# Patient Record
Sex: MALE | Race: WHITE | Employment: UNEMPLOYED | URBAN - METROPOLITAN AREA
[De-identification: names, ages, dates, MRNs, and addresses within clinical notes are randomized per-mention and may not be internally consistent; named-entity substitution may affect disease eponyms.]

---

## 2017-02-10 ENCOUNTER — HOSPITAL ENCOUNTER (INPATIENT)
Facility: HOSPITAL | Age: 42
LOS: 6 days | Discharge: HOME | DRG: 056 | End: 2017-02-16
Attending: EMERGENCY MEDICINE | Admitting: HOSPITALIST
Payer: MEDICAID

## 2017-02-10 ENCOUNTER — APPOINTMENT (OUTPATIENT)
Dept: GENERAL RADIOLOGY | Facility: HOSPITAL | Age: 42
DRG: 056 | End: 2017-02-10
Attending: EMERGENCY MEDICINE
Payer: MEDICAID

## 2017-02-10 DIAGNOSIS — G70.00 MYASTHENIA GRAVIS (HCC): Primary | ICD-10-CM

## 2017-02-10 PROCEDURE — 71010 XR CHEST AP PORTABLE  (CPT=71010): CPT

## 2017-02-10 PROCEDURE — 99255 IP/OBS CONSLTJ NEW/EST HI 80: CPT | Performed by: OTHER

## 2017-02-10 RX ORDER — PYRIDOSTIGMINE BROMIDE 60 MG/1
120 TABLET ORAL EVERY 6 HOURS SCHEDULED
Status: DISCONTINUED | OUTPATIENT
Start: 2017-02-10 | End: 2017-02-16

## 2017-02-10 RX ORDER — DOCUSATE SODIUM 100 MG/1
100 CAPSULE, LIQUID FILLED ORAL 2 TIMES DAILY PRN
Status: DISCONTINUED | OUTPATIENT
Start: 2017-02-10 | End: 2017-02-16

## 2017-02-10 RX ORDER — PANTOPRAZOLE SODIUM 40 MG/1
40 TABLET, DELAYED RELEASE ORAL
Status: DISCONTINUED | OUTPATIENT
Start: 2017-02-11 | End: 2017-02-16

## 2017-02-10 RX ORDER — POTASSIUM CHLORIDE 20 MEQ/1
40 TABLET, EXTENDED RELEASE ORAL EVERY 4 HOURS
Status: COMPLETED | OUTPATIENT
Start: 2017-02-10 | End: 2017-02-11

## 2017-02-10 RX ORDER — FAMOTIDINE 40 MG/1
40 TABLET, FILM COATED ORAL 2 TIMES DAILY
Status: DISCONTINUED | OUTPATIENT
Start: 2017-02-10 | End: 2017-02-10

## 2017-02-10 RX ORDER — SODIUM PHOSPHATE, DIBASIC AND SODIUM PHOSPHATE, MONOBASIC 7; 19 G/133ML; G/133ML
1 ENEMA RECTAL ONCE AS NEEDED
Status: ACTIVE | OUTPATIENT
Start: 2017-02-10 | End: 2017-02-10

## 2017-02-10 RX ORDER — ONDANSETRON 2 MG/ML
4 INJECTION INTRAMUSCULAR; INTRAVENOUS EVERY 6 HOURS PRN
Status: DISCONTINUED | OUTPATIENT
Start: 2017-02-10 | End: 2017-02-16

## 2017-02-10 RX ORDER — BISACODYL 10 MG
10 SUPPOSITORY, RECTAL RECTAL
Status: DISCONTINUED | OUTPATIENT
Start: 2017-02-10 | End: 2017-02-16

## 2017-02-10 RX ORDER — ACETAMINOPHEN 325 MG/1
650 TABLET ORAL EVERY 6 HOURS PRN
Status: DISCONTINUED | OUTPATIENT
Start: 2017-02-10 | End: 2017-02-16

## 2017-02-10 RX ORDER — POLYETHYLENE GLYCOL 3350 17 G/17G
17 POWDER, FOR SOLUTION ORAL DAILY PRN
Status: DISCONTINUED | OUTPATIENT
Start: 2017-02-10 | End: 2017-02-16

## 2017-02-10 RX ORDER — AZATHIOPRINE 50 MG/1
50 TABLET ORAL DAILY
Status: DISCONTINUED | OUTPATIENT
Start: 2017-02-11 | End: 2017-02-16

## 2017-02-10 RX ORDER — PREDNISONE 20 MG/1
60 TABLET ORAL
Status: DISCONTINUED | OUTPATIENT
Start: 2017-02-11 | End: 2017-02-16

## 2017-02-10 RX ORDER — PYRIDOSTIGMINE BROMIDE 60 MG/1
90 TABLET ORAL
Status: DISCONTINUED | OUTPATIENT
Start: 2017-02-10 | End: 2017-02-10

## 2017-02-10 RX ORDER — ALPRAZOLAM 0.5 MG/1
0.5 TABLET ORAL NIGHTLY PRN
Status: DISCONTINUED | OUTPATIENT
Start: 2017-02-10 | End: 2017-02-16

## 2017-02-10 RX ORDER — METOCLOPRAMIDE HYDROCHLORIDE 5 MG/ML
10 INJECTION INTRAMUSCULAR; INTRAVENOUS EVERY 8 HOURS PRN
Status: DISCONTINUED | OUTPATIENT
Start: 2017-02-10 | End: 2017-02-16

## 2017-02-10 NOTE — ED PROVIDER NOTES
Patient Seen in: [facility] AND [facility] Emergency Department    History   Patient presents with:  Dyspnea ARA SOB (respiratory)    Stated Complaint: sob    HPI    54-year-old male with history of myasthenia gravis presently taking Mestinon and azathioprine da total) by mouth nightly as needed for Sleep.   famotidine 40 MG Oral Tab,  One po bid   Multiple Vitamins-Minerals (MULTIVITAMIN OR),  Take  by mouth. Ibuprofen 200 MG Oral Tab,  Take 200 mg by mouth every 6 (six) hours as needed.        Family History motion  Psychiatric: patient is oriented X 3, there is no agitation    DIFFERENTIAL DIAGNOSIS: After history and physical exam differential diagnosis was considered for myasthenia exacerbation, pulmonary embolism        ED Course     Labs Reviewed   BASIC Prescribed:  Current Discharge Medication List        Present on Admission  Date Reviewed: 9/3/2015          ICD-10-CM Noted POA    Myasthenia gravis ([facility] [address] G70.00 9/3/2015 Unknown

## 2017-02-10 NOTE — ED INITIAL ASSESSMENT (HPI)
Pt reports feeling sob that started 2 days ago and became progressively worse today, sts that he is able to drink and eat but feels that he has a lump in his throat.  Pt with a hx of MG currently on steroids  Sees neurologist out of [facility]

## 2017-02-11 ENCOUNTER — APPOINTMENT (OUTPATIENT)
Dept: INTERVENTIONAL RADIOLOGY/VASCULAR | Facility: HOSPITAL | Age: 42
DRG: 056 | End: 2017-02-11
Attending: RADIOLOGY
Payer: MEDICAID

## 2017-02-11 PROCEDURE — 99233 SBSQ HOSP IP/OBS HIGH 50: CPT | Performed by: OTHER

## 2017-02-11 PROCEDURE — 6A551Z3 PHERESIS OF PLASMA, MULTIPLE: ICD-10-PCS | Performed by: HOSPITALIST

## 2017-02-11 PROCEDURE — B543ZZA ULTRASONOGRAPHY OF RIGHT JUGULAR VEINS, GUIDANCE: ICD-10-PCS | Performed by: RADIOLOGY

## 2017-02-11 PROCEDURE — 05HM33Z INSERTION OF INFUSION DEVICE INTO RIGHT INTERNAL JUGULAR VEIN, PERCUTANEOUS APPROACH: ICD-10-PCS | Performed by: RADIOLOGY

## 2017-02-11 RX ORDER — SODIUM CHLORIDE 0.9 % (FLUSH) 0.9 %
10 SYRINGE (ML) INJECTION AS NEEDED
Status: DISCONTINUED | OUTPATIENT
Start: 2017-02-11 | End: 2017-02-16

## 2017-02-11 RX ORDER — HEPARIN SODIUM (PORCINE) LOCK FLUSH IV SOLN 100 UNIT/ML 100 UNIT/ML
1.5 SOLUTION INTRAVENOUS ONCE
Status: DISCONTINUED | OUTPATIENT
Start: 2017-02-11 | End: 2017-02-16

## 2017-02-11 RX ORDER — SODIUM CHLORIDE 9 MG/ML
INJECTION, SOLUTION INTRAVENOUS
Status: COMPLETED
Start: 2017-02-11 | End: 2017-02-11

## 2017-02-11 RX ORDER — HEPARIN SODIUM 1000 [USP'U]/ML
INJECTION, SOLUTION INTRAVENOUS; SUBCUTANEOUS
Status: COMPLETED
Start: 2017-02-11 | End: 2017-02-11

## 2017-02-11 RX ORDER — HYDROCODONE BITARTRATE AND ACETAMINOPHEN 5; 325 MG/1; MG/1
1 TABLET ORAL EVERY 4 HOURS PRN
Status: DISCONTINUED | OUTPATIENT
Start: 2017-02-11 | End: 2017-02-16

## 2017-02-11 RX ORDER — LIDOCAINE HYDROCHLORIDE 20 MG/ML
INJECTION, SOLUTION EPIDURAL; INFILTRATION; INTRACAUDAL; PERINEURAL
Status: COMPLETED
Start: 2017-02-11 | End: 2017-02-11

## 2017-02-11 RX ORDER — ENOXAPARIN SODIUM 100 MG/ML
40 INJECTION SUBCUTANEOUS DAILY
Status: DISCONTINUED | OUTPATIENT
Start: 2017-02-11 | End: 2017-02-13

## 2017-02-11 RX ORDER — ALBUMIN, HUMAN INJ 5% 5 %
3500 SOLUTION INTRAVENOUS ONCE
Status: COMPLETED | OUTPATIENT
Start: 2017-02-11 | End: 2017-02-11

## 2017-02-11 NOTE — PROGRESS NOTES
[facility] Hospitalist Progress Note     PCP: Asuncion Rodriguez MD    CC: Follow up, MG flare       Assessment/Plan:     Jorge Parra is a 42year old male with PMH sig for thymoma, Myastenia gravis, and PE in 2012 who presented several days of increasing jean claude 1417 : 245 lb (111.131 kg)  09/03/15 1330 : 249 lb (112.946 kg)  09/25/14 1330 : 249 lb (112.946 kg)      Exam  Gen: No acute distress, alert and oriented x3  Neck Supple, no JVD, tunneled cath  Pulm: Lungs clear, normal respiratory effort, No wheezing or the cardiac silhouette and mediastinum, compatible with prior thymectomy (as noted in the electronic record). Wall pulmonary vasculature. MEDIAST/POP:   No visible mass or adenopathy. LUNGS/PLEURA: No significant pulmonary parenchymal abnormalities.   No Leonel Ng MD on 2/11/2017 at 9:01          2/11/2017  CONCLUSION: Successful placement of a non-tunneled large bore central venous catheter. The catheter is ready for use.

## 2017-02-11 NOTE — CONSULTS
[facility] HOSP - [facility]    Report of Consultation    Debra Yoder Patient Status:  Inpatient    1975 MRN [id number]   Location [facility] Attending Donald Rogers MD   Hosp Day # 1 PCP Smita Valdovinos MD     D (TYLENOL) tab 650 mg 650 mg Oral Q6H PRN   docusate sodium (COLACE) cap 100 mg 100 mg Oral BID PRN   PEG 3350 (MIRALAX) powder packet 17 g 17 g Oral Daily PRN   magnesium hydroxide (MILK OF MAGNESIA) 400 MG/5ML suspension 30 mL 30 mL Oral Daily PRN   bisac 245 lb (111.131 kg)      Exam  Gen: No acute distress  Heent: NC AT, mucous memb clear, neck supple  Pulm: Lungs clear, normal respiratory effort  CV: Heart with regular rate and rhythm, no edema  Abd: Abdomen soft, nontender, nondistended, no organomegaly

## 2017-02-11 NOTE — CONSULTS
[facility]    April Wood Patient Status:  Inpatient    1975 MRN [id number]   Location [facility] 2W/SW Attending Karl Infante MD   Hosp Day # 1 PCP Andrade Alvarez MD     Date of Admission: 2/10/2017  Admission Diagnosis: Sandra one po daily for week and then 1/2 tab po daily Disp: 60 tablet Rfl: 0   Omeprazole 40 MG Oral Capsule Delayed Release Take 1 capsule (40 mg total) by mouth daily.  Disp: 90 capsule Rfl: 3   azathioprine 50 MG Oral Tab TAKE ONE TABLET BY MOUTH ONCE DAILY Galo Mcmullen BIPAP  Head: Normocephalic, without obvious abnormality, atraumatic  Neck: no adenopathy, no carotid bruit and supple, symmetrical, trachea midline  Back: symmetric, no curvature. ROM normal. No CVA tenderness.   Lungs: clear to auscultation bilaterally  Ch PO intake per speech. IVF's prn, lytes prn  6. Proph- as above, LMWH. Start PPI given need for high dose steroids  7. Dispo- full code  -pt is at risk for further clinical deterioration and will need ongoing ICU care.     Critical Care Time greater than: 3

## 2017-02-11 NOTE — BRIEF PROCEDURE NOTE
[facility] HOSP - [facility]  Brief IR Post-Procedure Note    Vickie Mcmullen Patient Status:  Inpatient    1975 MRN [id number]   Location [facility] Attending Flor Mclaughlin MD   Hosp Day # 1 PCP Richard Paez MD

## 2017-02-11 NOTE — PROGRESS NOTES
Neurology Inpatient Follow-up Note      HPI:     Extensive history reviewed. In brief, patient has history of myasthenia gravis. He initially presented in 2013 with ptosis. He did have a thymoma in 2012 which was resected.   In addition, he has history o gait: no finger-nose-finger dysmetria, gait deferred    ASSESSMENT/PLAN:   Myasthenia gravis  Although atypical of prior episodes, this appears to be myasthenia gravis exacerbation.     –Following serial vital capacities    –Undergoing plasma exchange    –P

## 2017-02-11 NOTE — SLP NOTE
ADULT SWALLOWING EVALUATION    ASSESSMENT & PLAN   ASSESSMENT    Pt c/o difficulty swallowing intermittently past few weeks with extra effort needed for \"food to go down. \" Lingual skills adequate for bolus control of solid and thin liquid trials.  Bite re EXAMINATION  Dentition: Functional  Symmetry: Within Functional Limits  Strength:  Within Functional Limits  Tone: Within Functional Limits  Range of Motion: Within Functional Limits  Rate of Motion: Within Functional Limits    Voice Quality: Clear  Respira

## 2017-02-11 NOTE — CONSULTS
[facility]    PATIENT'S NAME: Chrissy Valadez   ATTENDING PHYSICIAN: Pam Heller MD   CONSULTING PHYSICIAN: Shraddha Zhang MD   PATIENT ACCOUNT#:   [de-identified]    LOCATION:  [address] [place] [address] RECORD #:   [id number]       DATE OF BIRTH:  1 surgeries. ALLERGIES:  Denies allergies to medications. SOCIAL HISTORY:  Past history of tobacco use. Social alcohol use. He uses no illegal drugs.       PHYSICAL EXAMINATION:    VITAL SIGNS:  Recorded in the chart, temperature 97.6, pulse 86, r

## 2017-02-11 NOTE — H&P
[facility] Hospitalist H&P     CC: Patient presents with:  Dyspnea ARA SOB (respiratory)       PCP: Vicie Bosworth, MD      Assessment and Plan       Nikky Richey is a 42year old male with PMH sig for thymoma, Myastenia gravis, and PE in 2012 who presented did not help  Has not been on [facility] in several years. Denies CP. NO N/V. No cough.   No fevers        Review of Systems  12 point systems reviewed, please see HPI for pertinent positives, otherwise negative    History   Below confirmed with pateint  PMH  Pas AMYLASE, LIPASE, LDH in the last 72 hours. Invalid input(s): ALPHOS, TBIL, DBIL, TPROT    No results for input(s): TROP in the last 72 hours.     Additional Diagnostics: ECG:    Radiology: Xr Chest Ap Portable  (cpt=71010)    2/10/2017  PROCEDURE: XR BIANCA

## 2017-02-12 PROCEDURE — 99232 SBSQ HOSP IP/OBS MODERATE 35: CPT | Performed by: OTHER

## 2017-02-12 RX ORDER — ALBUMIN, HUMAN INJ 5% 5 %
3500 SOLUTION INTRAVENOUS ONCE
Status: DISCONTINUED | OUTPATIENT
Start: 2017-02-13 | End: 2017-02-16

## 2017-02-12 NOTE — OCCUPATIONAL THERAPY NOTE
OCCUPATIONAL THERAPY EVALUATION - INPATIENT     Room Number: 221/221-A  Evaluation Date: 2/12/2017  Type of Evaluation: Initial  Presenting Problem: Myasthenia Gravis    Physician Order: IP Consult to Occupational Therapy  Reason for Therapy: ADL/IADL Dysf Gross Motor: WFL  Fine Motor: WFL           ACTIVITIES OF DAILY LIVING ASSESSMENT  AM-PAC ‘6-Clicks’ Inpatient Daily Activity Short Form  How much help from another person does the patient currently need…  -   Putting on and taking off regular lower body

## 2017-02-12 NOTE — PROGRESS NOTES
[facility] Hospitalist Progress Note     PCP: Dwight Negron MD    CC: Follow up, MG flare, SOB, improving       Assessment/Plan:     Ryan Husbands is a 42year old male with PMH sig for thymoma, Myastenia gravis, and PE in 2012 who presented several days o Enriqueta 7 filed at 02/12/17 0600   Gross per 24 hour   Intake   1962 ml   Output   1300 ml   Net    662 ml       Last 3 Weights  02/12/17 0540 : 242 lb 9.6 oz (110.043 kg)  02/10/17 1417 : 245 lb (111.131 kg)  09/03/15 1330 : 249 lb (112.946 kg)  09/ imaging

## 2017-02-12 NOTE — PROGRESS NOTES
[facility]    Progress Note    Althia Dinorah Patient Status:  Inpatient    1975 MRN [id number]   Location [facility] 2W/SW Attending Ajit Meyers MD   Hosp Day # 2 PCP Shalini Arredondo MD       Subjective:   rDu Yu Fis (cpt=71010)    2/10/2017  CONCLUSION: No acute cardiopulmonary abnormality. There has been prior thymectomy. Ir Central Venous Catheter Insertion    2/11/2017  CONCLUSION: Successful placement of a non-tunneled large bore central venous catheter.  Kostas Lara

## 2017-02-12 NOTE — PROGRESS NOTES
Eliseo Rodriguez Patient Status:  Inpatient    1975 MRN [id number]   Location [facility] 2W/SW Attending Steward Kussmaul, MD   Hosp Day # 2 PCP Valente Alcantar MD     Critical Care Progress Note    Assessment/Plan:  1.  Acute resp failure- NAD   HEENT: lips, mucosa, tongue normal. Mallampati III   Lungs: Diminished at the bases   Chest wall: No tenderness or deformity. Heart: Regular rate and rhythm, normal S1S2, no murmur. Abdomen: soft, non-tender, non-distended, positive BS.    Extremi

## 2017-02-12 NOTE — PROGRESS NOTES
Neurology Inpatient Follow-up Note      HPI:     Patient feeling better today. No further dyspnea.       Past Medical Hisotory:  Reviewed    Medications:  Reviewed    Allergies:  No Known Allergies      ROS:   GENERAL: no weight loss or fevers  HEENT: titi

## 2017-02-12 NOTE — PLAN OF CARE
DISCHARGE PLANNING    • Discharge to home or other facility with appropriate resources Progressing        Impaired Swallowing    • Minimize aspiration risk Progressing      Bedside speech evaluation today. Tolerating general diet well.        PAIN - ADULT

## 2017-02-12 NOTE — PHYSICAL THERAPY NOTE
PHYSICAL THERAPY EVALUATION - INPATIENT     Room Number: 221/221-A  Evaluation Date: 2/12/2017  Type of Evaluation: Initial Evaluation          Reason for Therapy: Mobility Dysfunction and Discharge Planning    History related to current admission: 42 (including adjusting bedclothes, sheets and blankets)?: None   -   Sitting down on and standing up from a chair with arms (e.g., wheelchair, bedside commode, etc.): None   -   Moving from lying on back to sitting on the side of the bed?: None   How much he

## 2017-02-12 NOTE — PLAN OF CARE
DISCHARGE PLANNING    • Discharge to home or other facility with appropriate resources Progressing        Impaired Swallowing    • Minimize aspiration risk Progressing        PAIN - ADULT    • Verbalizes/displays adequate comfort level or patient's stated

## 2017-02-13 PROCEDURE — 99232 SBSQ HOSP IP/OBS MODERATE 35: CPT | Performed by: OTHER

## 2017-02-13 RX ORDER — SODIUM CHLORIDE 9 MG/ML
INJECTION, SOLUTION INTRAVENOUS
Status: DISPENSED
Start: 2017-02-13 | End: 2017-02-14

## 2017-02-13 RX ORDER — POTASSIUM CHLORIDE 20 MEQ/1
40 TABLET, EXTENDED RELEASE ORAL EVERY 4 HOURS
Status: COMPLETED | OUTPATIENT
Start: 2017-02-13 | End: 2017-02-13

## 2017-02-13 NOTE — DISCHARGE PLANNING
2/13/17 CM Discharge planning   Pt resides with spouse, 2 level home reports independent prior to admission. Per PT eval no skilled at this time, recommending return home at discharge. Referral to Financial Counselor or IPA consideration.  Pt is uninsured

## 2017-02-13 NOTE — PROGRESS NOTES
Neurology Inpatient Follow-up Note      HPI:     Patient well today. No further dyspnea, no weakness.       Past Medical Hisotory:  Reviewed    Medications:  Reviewed    Allergies:  No Known Allergies      ROS:   GENERAL: no weight loss or fevers  HEENT: d

## 2017-02-13 NOTE — PLAN OF CARE
DISCHARGE PLANNING    • Discharge to home or other facility with appropriate resources Progressing    Plasmapheresis planned for tomorrow.       Impaired Swallowing    • Minimize aspiration risk Progressing    Patient tolerating regular consistency      FILIBERTO

## 2017-02-13 NOTE — PROGRESS NOTES
Pulmonary Progress Note     Assessment / Plan:  1. Acute resp failure- due to progressive neuromuscular weakness in association with MG crisis  -off bipap  -serial vital capacities and NIFs  2.  MG crisis- neuro consulted  -ongoing steroid burst  -mestinon

## 2017-02-13 NOTE — PROGRESS NOTES
[facility] HOSP - [facility]    Progress Note    Debra Yoder Patient Status:  Inpatient    1975 MRN [id number]   Location [facility] 2W/SW Attending Destini Urrutia MD   Hosp Day # 3 PCP Smita Valdovinos MD       Subjective:   Debra Yoder is

## 2017-02-13 NOTE — PAYOR COMM NOTE
Marzena Centeno #[id number]  (42 year old M)       Josse Larose MD Physician Addendum  H&P 2/10/2017  6:44 PM      Expand All Collapse All        [facility] Hospitalist H&P      CC: Patient presents with:  Dyspnea ARA SOB (respirato Willie Mccarty is a 42year old male with PMH sig for thymoma, Myastenia gravis, and PE in 2012 who presented several days of increasing shortness of breath.  He states that over the past few weeks he has noticed some intermittent difficulty with swallowing CV: Heart with regular rate and rhythm, No murmurs, rubs, gallops  Abd: Abdomen soft, nontender, nondistended, no organomegaly, bowel sounds present  MSK: Full range of motion in extremities, no clubbing, no cyanosis.  No Lower extremity edema  Skin: no ra Revision History       Date/Time User Provider Type Action     2/10/2017  9:21 PM Baldemar Soto MD Physician Addend     2/10/2017  9:19 PM Baldemar Soto MD Physician Sign     View Details Report

## 2017-02-13 NOTE — PROGRESS NOTES
[facility] Hospitalist Progress Note     PCP: Yudith Amaro MD    CC: Follow up, MG flare, SOB, improving       Assessment/Plan:     Sarabjit Mcfadden is a 42year old male with PMH sig for thymoma, Myastenia gravis, and PE in 2012 who presented several days o ml   Output   1150 ml   Net   -150 ml       Last 3 Weights  02/13/17 0600 : 242 lb 1.6 oz (109.816 kg)  02/12/17 0540 : 242 lb 9.6 oz (110.043 kg)  02/10/17 1417 : 245 lb (111.131 kg)  09/03/15 1330 : 249 lb (112.946 kg)  09/25/14 1330 : 249 lb (112.946 kg 4190  02/12/17   0456  02/13/17   0438   ALT  36   --    --    AST  31   --    --    ALB  3.7  4.1  3.9       No results for input(s): PGLU in the last 72 hours. No results for input(s): TROP in the last 72 hours.     Imaging:  No new imaging

## 2017-02-14 ENCOUNTER — TELEPHONE (OUTPATIENT)
Dept: NEUROLOGY | Facility: CLINIC | Age: 42
End: 2017-02-14

## 2017-02-14 PROCEDURE — 99232 SBSQ HOSP IP/OBS MODERATE 35: CPT | Performed by: OTHER

## 2017-02-14 RX ORDER — SODIUM CHLORIDE 9 MG/ML
INJECTION, SOLUTION INTRAVENOUS ONCE
Status: DISCONTINUED | OUTPATIENT
Start: 2017-02-14 | End: 2017-02-16

## 2017-02-14 RX ORDER — ENOXAPARIN SODIUM 100 MG/ML
40 INJECTION SUBCUTANEOUS DAILY
Status: DISCONTINUED | OUTPATIENT
Start: 2017-02-14 | End: 2017-02-16

## 2017-02-14 RX ORDER — POTASSIUM CHLORIDE 20 MEQ/1
40 TABLET, EXTENDED RELEASE ORAL EVERY 4 HOURS
Status: COMPLETED | OUTPATIENT
Start: 2017-02-14 | End: 2017-02-14

## 2017-02-14 NOTE — PLAN OF CARE
Problem: SKIN/TISSUE INTEGRITY - ADULT  Goal: Skin integrity remains intact  INTERVENTIONS  - Assess and document risk factors for pressure ulcer development  - Assess and document skin integrity  - Monitor for areas of redness and/or skin breakdown  - Ini interventions unsuccessful or patient reports new pain   Outcome: Progressing  Norco given for R IJ vascath \"burning\" sensation. Verbalized relief.     Problem: RISK FOR INFECTION - ADULT  Goal: Absence of fever/infection during anticipated neutropenic pe

## 2017-02-14 NOTE — SLP NOTE
SPEECH DAILY NOTE - INPATIENT    Evaluation Date: 02/14/2017    ASSESSMENT & PLAN   ASSESSMENT  Patient seen to monitor tolerance of PO diet. Patient with timely oral pharyngeal swallow and without clinical signs of aspiration. Normal swallow.   Will /c f

## 2017-02-14 NOTE — TELEPHONE ENCOUNTER
Called office of patient's outpatient neurologist Dr. Antonio Huffman to notify her of patient's admission and status; message, including my contact info should she have any questions, left with office staff.      Per office staff request, will fax my progres

## 2017-02-14 NOTE — PROGRESS NOTES
Pulmonary Progress Note      NAME: Delaney Lovell - ROOM: 3/591-S - MRN: [id number] - Age: 42year old - : 1975    Assessment/Plan:  1.  Acute resp failure- due to progressive neuromuscular weakness in association with MG crisis  -off bipap  -seria auscultation bilaterally, no focal wheezes or crackles    Chest wall: No tenderness or deformity. Heart: Regular rate and rhythm, normal S1S2, no murmur. Abdomen: soft, non-tender, non-distended, positive BS.    Extremity: no edema   Skin: No rashes or

## 2017-02-14 NOTE — PROGRESS NOTES
[facility]    Progress Note    Laradha Irvin Patient Status:  Inpatient    1975 MRN [id number]   Location [facility] 2W/SW Attending Wallace Castillo MD   Hosp Day # 4 PCP Hernan Bender MD       Subjective:   Laradha Irvin is MD  2/14/2017

## 2017-02-14 NOTE — PROGRESS NOTES
[facility] Hospitalist Progress Note     PCP: Andrade Alvarez MD    CC: Follow up, MG flare, SOB, improving       Assessment/Plan:     April Wood is a 42year old male with PMH sig for thymoma, Myastenia gravis, and PE in 2012 who presented several days o 02/14/17 0600   Gross per 24 hour   Intake    562 ml   Output    350 ml   Net    212 ml       Last 3 Weights  02/13/17 0600 : 242 lb 1.6 oz (109.816 kg)  02/12/17 0540 : 242 lb 9.6 oz (110.043 kg)  02/10/17 1417 : 245 lb (111.131 kg)  09/03/15 1330 : 249 l 4.1  3.9  4.2       No results for input(s): PGLU in the last 72 hours. No results for input(s): TROP in the last 72 hours.     Imaging:  No new imaging

## 2017-02-14 NOTE — DISCHARGE PLANNING
SW received order for fiances per MD. Financial services was contacted 2/13 - pt is currently Medicaid pending.     Florence Vega, [address]

## 2017-02-15 PROCEDURE — 99232 SBSQ HOSP IP/OBS MODERATE 35: CPT | Performed by: OTHER

## 2017-02-15 RX ORDER — SODIUM CHLORIDE 9 MG/ML
INJECTION, SOLUTION INTRAVENOUS
Status: DISPENSED
Start: 2017-02-15 | End: 2017-02-16

## 2017-02-15 RX ORDER — DIPHENHYDRAMINE HYDROCHLORIDE 50 MG/ML
25 INJECTION INTRAMUSCULAR; INTRAVENOUS ONCE
Status: COMPLETED | OUTPATIENT
Start: 2017-02-15 | End: 2017-02-15

## 2017-02-15 RX ORDER — DIPHENHYDRAMINE HCL 25 MG
25 CAPSULE ORAL ONCE
Status: COMPLETED | OUTPATIENT
Start: 2017-02-15 | End: 2017-02-15

## 2017-02-15 RX ORDER — DIPHENHYDRAMINE HCL 25 MG
25 CAPSULE ORAL EVERY 6 HOURS PRN
Status: DISCONTINUED | OUTPATIENT
Start: 2017-02-15 | End: 2017-02-16

## 2017-02-15 NOTE — PROGRESS NOTES
Neurology Inpatient Follow-up Note      HPI:     Patient feeling well, no complaints. Sitting comfortably in chair eating lunch.       Past Medical Hisotory:  Reviewed    Medications:  Reviewed    Allergies:  No Known Allergies      ROS:   GENERAL: no weig

## 2017-02-15 NOTE — PROGRESS NOTES
[facility] Hospitalist Progress Note     PCP: Aj Grossman MD    CC: Follow up, MG flare, SOB, improving       Assessment/Plan:   Kayla Urrutia is a 42year old male with PMH sig for thymoma, Myastenia gravis, and PE in 2012 who presented several days of °C)-99.9 °F (37.7 °C)] 98 °F (36.7 °C)  Pulse:  [63-88] 63  Resp:  [11-25] 11  BP: ()/(56-70) 94/56 mmHg    Intake/Output:    Intake/Output Summary (Last 24 hours) at 02/15/17 1013  Last data filed at 02/15/17 0600   Gross per 24 hour   Intake      0 139  140   K  3.6  3.6  3.9   CL  106  106  105   CO2  28  25  28   BUN  11  9  11   CREATSERUM  0.96  0.86  0.82   CA  9.0  8.7  8.9   MG   --   2.0  2.1   PHOS  4.7  3.5  3.8   GLU  98  98  97       Recent Labs   Lab  02/13/17   0438  02/14/17   0601  02

## 2017-02-15 NOTE — PROGRESS NOTES
Pulmonary Progress Note     Assessment / Plan:  1. Acute resp failure- due to progressive neuromuscular weakness in association with MG crisis  -off bipap  -check vital capacity and NIF daily  2.  MG crisis- neuro consulted  -ongoing steroid burst  -mestino

## 2017-02-16 VITALS
TEMPERATURE: 98 F | WEIGHT: 242.81 LBS | RESPIRATION RATE: 13 BRPM | OXYGEN SATURATION: 98 % | BODY MASS INDEX: 32.18 KG/M2 | DIASTOLIC BLOOD PRESSURE: 66 MMHG | HEIGHT: 73 IN | HEART RATE: 76 BPM | SYSTOLIC BLOOD PRESSURE: 112 MMHG

## 2017-02-16 PROCEDURE — 99232 SBSQ HOSP IP/OBS MODERATE 35: CPT | Performed by: OTHER

## 2017-02-16 RX ORDER — PYRIDOSTIGMINE BROMIDE 60 MG/1
120 TABLET ORAL EVERY 6 HOURS SCHEDULED
Qty: 240 TABLET | Refills: 0 | Status: SHIPPED | OUTPATIENT
Start: 2017-02-16 | End: 2017-04-05

## 2017-02-16 RX ORDER — PYRIDOSTIGMINE BROMIDE 60 MG/1
120 TABLET ORAL EVERY 6 HOURS SCHEDULED
Qty: 240 TABLET | Refills: 0 | Status: SHIPPED | OUTPATIENT
Start: 2017-02-16 | End: 2017-02-16

## 2017-02-16 RX ORDER — PREDNISONE 20 MG/1
60 TABLET ORAL
Qty: 90 TABLET | Refills: 0 | Status: SHIPPED | OUTPATIENT
Start: 2017-02-16 | End: 2017-02-16

## 2017-02-16 RX ORDER — ESOMEPRAZOLE MAGNESIUM 40 MG/1
40 CAPSULE, DELAYED RELEASE ORAL DAILY
Qty: 30 CAPSULE | Refills: 0 | Status: SHIPPED | OUTPATIENT
Start: 2017-02-16 | End: 2017-06-13 | Stop reason: ALTCHOICE

## 2017-02-16 RX ORDER — AZATHIOPRINE 50 MG/1
TABLET ORAL
Qty: 30 TABLET | Refills: 0 | Status: SHIPPED | OUTPATIENT
Start: 2017-02-16 | End: 2017-02-16

## 2017-02-16 RX ORDER — AZATHIOPRINE 50 MG/1
TABLET ORAL
Qty: 30 TABLET | Refills: 0 | Status: SHIPPED | OUTPATIENT
Start: 2017-02-16 | End: 2017-02-23

## 2017-02-16 RX ORDER — PREDNISONE 20 MG/1
60 TABLET ORAL
Qty: 90 TABLET | Refills: 0 | Status: SHIPPED | OUTPATIENT
Start: 2017-02-16 | End: 2017-03-17

## 2017-02-16 RX ORDER — ALPRAZOLAM 0.5 MG/1
0.5 TABLET ORAL NIGHTLY PRN
Qty: 10 TABLET | Refills: 0 | Status: SHIPPED | OUTPATIENT
Start: 2017-02-16 | End: 2017-03-17

## 2017-02-16 NOTE — PROGRESS NOTES
Pulmonary Progress Note      NAME: Jd Ascencio - ROOM: 744/769-O - MRN: [id number] - Age: 42year old - : 1975    Assessment/Plan:  1.  Acute resp failure- due to progressive neuromuscular weakness in association with MG crisis  -d/c bipap  -check positive BS. Extremity: no edema   Skin: No rashes or lesions.     Recent Labs   Lab  02/16/17   0538   RBC  4.65   HGB  14.2   HCT  42.7   MCV  91.9   MCH  30.5   MCHC  33.2   RDW  14.1   WBC  7.0   PLT  116*     Recent Labs   Lab  02/14/17   0601  02/15

## 2017-02-16 NOTE — PROGRESS NOTES
[facility]    Brief Note    Winter Reyes Patient Status:  Inpatient    1975 MRN [id number]   Location [facility] 2W/SW Attending Cielo Dutta,    Hosp Day # 6 PCP Eder Rabago MD     Date of Note:  2017  Time of

## 2017-02-16 NOTE — PLAN OF CARE
Problem: SKIN/TISSUE INTEGRITY - ADULT  Goal: Skin integrity remains intact  INTERVENTIONS  - Assess and document risk factors for pressure ulcer development  - Assess and document skin integrity  - Monitor for areas of redness and/or skin breakdown  - Ini neutropenic guidelines   Outcome: Progressing  Pt remains afebrile, wbc wnl.

## 2017-02-16 NOTE — PROGRESS NOTES
[facility] Hospitalist Progress Note     PCP: Kathryn Shaw MD    CC: Follow up, MG flare, SOB, improving       Assessment/Plan:   Alex Murdock is a 42year old male with PMH sig for thymoma, Myastenia gravis, and PE in 2012 who presented several days of (37.2 °C)] 98 °F (36.7 °C)  Pulse:  [] 81  Resp:  [4-26] 24  BP: ()/(53-81) 119/66 mmHg    Intake/Output:    Intake/Output Summary (Last 24 hours) at 02/16/17 1424  Last data filed at 02/15/17 2200   Gross per 24 hour   Intake   1357 ml   Outpu CREATSERUM  0.86  0.82  1.01   CA  8.7  8.9  8.8   MG  2.0  2.1   --    PHOS  3.5  3.8   --    GLU  98  97  100*       Recent Labs   Lab  02/14/17   0601  02/15/17   0535   ALB  4.2  4.1       No results for input(s): PGLU in the last 72 hours.     No res

## 2017-02-16 NOTE — PROGRESS NOTES
Neurology Inpatient Follow-up Note      HPI:     Patient feeling well, no complaints.          Past Medical Hisotory:  Reviewed    Medications:  Reviewed    Allergies:  No Known Allergies      ROS:   GENERAL: no weight loss or fevers  HEENT: denies nasal co

## 2017-02-17 NOTE — DISCHARGE SUMMARY
[facility] Hospitalist Discharge Summary   Patient ID:  Malgorzata Sunshine  [id number]  48 year old  11/9/1975    Admit date: 2/10/2017  Discharge date: 2/16/2017  Primary Care Physician: Mikki Stewart MD   Attending Physician: No att. providers found   Consults: [place]  -continued imuran  -SLP did eval, ok for reg diet with thin liquids  -did d/w neurology, patient's symptoms resolved during stay and most significantly after first plex on 2/11, no sob with ambulation or rest on day of d/c, ok for d/c to home with [id number]     Phone:  [phone]    - Esomeprazole Magnesium 40 MG Cpdr      You can get these medications from any pharmacy     Bring a paper prescription for each of these medications    - alprazolam 0.5 MG Tabs  - azathioprine 50 MG Tabs  - predniSONE 20

## 2019-07-26 PROBLEM — Z90.89 HISTORY OF THYMECTOMY: Status: ACTIVE | Noted: 2019-03-11

## 2019-07-30 PROCEDURE — 87491 CHLMYD TRACH DNA AMP PROBE: CPT | Performed by: INTERNAL MEDICINE

## 2019-07-30 PROCEDURE — 87591 N.GONORRHOEAE DNA AMP PROB: CPT | Performed by: INTERNAL MEDICINE

## 2020-07-28 ENCOUNTER — LAB ENCOUNTER (OUTPATIENT)
Dept: LAB | Age: 45
End: 2020-07-28
Attending: FAMILY MEDICINE
Payer: COMMERCIAL

## 2020-07-28 ENCOUNTER — HOSPITAL ENCOUNTER (OUTPATIENT)
Dept: GENERAL RADIOLOGY | Age: 45
Discharge: HOME | End: 2020-07-28
Attending: FAMILY MEDICINE
Payer: COMMERCIAL

## 2020-07-28 ENCOUNTER — OFFICE VISIT (OUTPATIENT)
Dept: FAMILY MEDICINE CLINIC | Facility: CLINIC | Age: 45
End: 2020-07-28
Payer: COMMERCIAL

## 2020-07-28 VITALS
BODY MASS INDEX: 30.75 KG/M2 | SYSTOLIC BLOOD PRESSURE: 123 MMHG | TEMPERATURE: 97 F | WEIGHT: 227 LBS | HEIGHT: 71.9 IN | HEART RATE: 63 BPM | DIASTOLIC BLOOD PRESSURE: 82 MMHG

## 2020-07-28 DIAGNOSIS — Z11.3 SCREENING EXAMINATION FOR STD (SEXUALLY TRANSMITTED DISEASE): ICD-10-CM

## 2020-07-28 DIAGNOSIS — G70.00 MYASTHENIA GRAVIS (HCC): Primary | ICD-10-CM

## 2020-07-28 DIAGNOSIS — Z00.00 ANNUAL PHYSICAL EXAM: ICD-10-CM

## 2020-07-28 DIAGNOSIS — G70.00 MYASTHENIA GRAVIS (HCC): ICD-10-CM

## 2020-07-28 LAB
ALBUMIN SERPL-MCNC: 3.8 G/DL (ref 3.4–5)
ALBUMIN/GLOB SERPL: 1.3 {RATIO} (ref 1–2)
ALP LIVER SERPL-CCNC: 63 U/L (ref 45–117)
ALT SERPL-CCNC: 167 U/L (ref 16–61)
ANION GAP SERPL CALC-SCNC: 7 MMOL/L (ref 0–18)
AST SERPL-CCNC: 174 U/L (ref 15–37)
BASOPHILS # BLD AUTO: 0.04 X10(3) UL (ref 0–0.2)
BASOPHILS NFR BLD AUTO: 1.2 %
BILIRUB SERPL-MCNC: 0.8 MG/DL (ref 0.1–2)
BUN BLD-MCNC: 8 MG/DL (ref 7–18)
BUN/CREAT SERPL: 10 (ref 10–20)
CALCIUM BLD-MCNC: 9.6 MG/DL (ref 8.5–10.1)
CHLORIDE SERPL-SCNC: 108 MMOL/L (ref 98–112)
CHOLEST SMN-MCNC: 205 MG/DL (ref ?–200)
CO2 SERPL-SCNC: 27 MMOL/L (ref 21–32)
COMPLEXED PSA SERPL-MCNC: 4.37 NG/ML (ref ?–4)
CREAT BLD-MCNC: 0.8 MG/DL (ref 0.7–1.3)
DEPRECATED RDW RBC AUTO: 54.4 FL (ref 35.1–46.3)
EOSINOPHIL # BLD AUTO: 0.11 X10(3) UL (ref 0–0.7)
EOSINOPHIL NFR BLD AUTO: 3.2 %
ERYTHROCYTE [DISTWIDTH] IN BLOOD BY AUTOMATED COUNT: 16.3 % (ref 11–15)
GLOBULIN PLAS-MCNC: 3 G/DL (ref 2.8–4.4)
GLUCOSE BLD-MCNC: 100 MG/DL (ref 70–99)
HCT VFR BLD AUTO: 44 % (ref 39–53)
HDLC SERPL-MCNC: 84 MG/DL (ref 40–59)
HGB BLD-MCNC: 15.4 G/DL (ref 13–17.5)
IMM GRANULOCYTES # BLD AUTO: 0.02 X10(3) UL (ref 0–1)
IMM GRANULOCYTES NFR BLD: 0.6 %
LDLC SERPL CALC-MCNC: 105 MG/DL (ref ?–100)
LYMPHOCYTES # BLD AUTO: 0.62 X10(3) UL (ref 1–4)
LYMPHOCYTES NFR BLD AUTO: 18.1 %
M PROTEIN MFR SERPL ELPH: 6.8 G/DL (ref 6.4–8.2)
MCH RBC QN AUTO: 31.8 PG (ref 26–34)
MCHC RBC AUTO-ENTMCNC: 35 G/DL (ref 31–37)
MCV RBC AUTO: 90.9 FL (ref 80–100)
MONOCYTES # BLD AUTO: 0.47 X10(3) UL (ref 0.1–1)
MONOCYTES NFR BLD AUTO: 13.7 %
NEUTROPHILS # BLD AUTO: 2.17 X10 (3) UL (ref 1.5–7.7)
NEUTROPHILS # BLD AUTO: 2.17 X10(3) UL (ref 1.5–7.7)
NEUTROPHILS NFR BLD AUTO: 63.2 %
NONHDLC SERPL-MCNC: 121 MG/DL (ref ?–130)
OSMOLALITY SERPL CALC.SUM OF ELEC: 292 MOSM/KG (ref 275–295)
PATIENT FASTING Y/N/NP: YES
PATIENT FASTING Y/N/NP: YES
PLATELET # BLD AUTO: 138 10(3)UL (ref 150–450)
POTASSIUM SERPL-SCNC: 3.9 MMOL/L (ref 3.5–5.1)
RBC # BLD AUTO: 4.84 X10(6)UL (ref 4.3–5.7)
SODIUM SERPL-SCNC: 142 MMOL/L (ref 136–145)
TRIGL SERPL-MCNC: 78 MG/DL (ref 30–149)
TSI SER-ACNC: 0.99 MIU/ML (ref 0.36–3.74)
VLDLC SERPL CALC-MCNC: 16 MG/DL (ref 0–30)
WBC # BLD AUTO: 3.4 X10(3) UL (ref 4–11)

## 2020-07-28 PROCEDURE — 87591 N.GONORRHOEAE DNA AMP PROB: CPT

## 2020-07-28 PROCEDURE — 80061 LIPID PANEL: CPT

## 2020-07-28 PROCEDURE — 80053 COMPREHEN METABOLIC PANEL: CPT

## 2020-07-28 PROCEDURE — 3074F SYST BP LT 130 MM HG: CPT | Performed by: FAMILY MEDICINE

## 2020-07-28 PROCEDURE — 3079F DIAST BP 80-89 MM HG: CPT | Performed by: FAMILY MEDICINE

## 2020-07-28 PROCEDURE — 99396 PREV VISIT EST AGE 40-64: CPT | Performed by: FAMILY MEDICINE

## 2020-07-28 PROCEDURE — 85025 COMPLETE CBC W/AUTO DIFF WBC: CPT

## 2020-07-28 PROCEDURE — 87389 HIV-1 AG W/HIV-1&-2 AB AG IA: CPT

## 2020-07-28 PROCEDURE — 84443 ASSAY THYROID STIM HORMONE: CPT

## 2020-07-28 PROCEDURE — 36415 COLL VENOUS BLD VENIPUNCTURE: CPT

## 2020-07-28 PROCEDURE — 82306 VITAMIN D 25 HYDROXY: CPT

## 2020-07-28 PROCEDURE — 71046 X-RAY EXAM CHEST 2 VIEWS: CPT | Performed by: FAMILY MEDICINE

## 2020-07-28 PROCEDURE — 3008F BODY MASS INDEX DOCD: CPT | Performed by: FAMILY MEDICINE

## 2020-07-28 PROCEDURE — 86780 TREPONEMA PALLIDUM: CPT

## 2020-07-28 PROCEDURE — 87491 CHLMYD TRACH DNA AMP PROBE: CPT

## 2020-07-28 NOTE — PROGRESS NOTES
HPI:    Patient ID: Susi Nguyen is a 38year old male. Doing well. History reviewed. Needs referral for neurologist for myastenia gravis      Review of Systems  Constitutional: Negative.   Negative for activity change, appetite change, diaphoresis an Neurological: He is alert and oriented to person, place, and time. He has normal reflexes.               ASSESSMENT/PLAN:   Myasthenia gravis (hcc)  (primary encounter diagnosis)  Annual physical exam  Screening examination for std (sexually transmitted d

## 2020-07-29 LAB
C TRACH DNA SPEC QL NAA+PROBE: NEGATIVE
N GONORRHOEA DNA SPEC QL NAA+PROBE: NEGATIVE

## 2020-07-31 LAB
25(OH)D3 SERPL-MCNC: 44 NG/ML (ref 30–100)
T PALLIDUM AB SER QL: NEGATIVE

## 2020-08-10 ENCOUNTER — OFFICE VISIT (OUTPATIENT)
Dept: SURGERY | Facility: CLINIC | Age: 45
End: 2020-08-10
Payer: COMMERCIAL

## 2020-08-10 VITALS
BODY MASS INDEX: 31.78 KG/M2 | HEART RATE: 59 BPM | WEIGHT: 227 LBS | DIASTOLIC BLOOD PRESSURE: 77 MMHG | HEIGHT: 71 IN | SYSTOLIC BLOOD PRESSURE: 113 MMHG

## 2020-08-10 DIAGNOSIS — R97.20 ELEVATED PSA: Primary | ICD-10-CM

## 2020-08-10 PROCEDURE — 3074F SYST BP LT 130 MM HG: CPT | Performed by: NURSE PRACTITIONER

## 2020-08-10 PROCEDURE — 3078F DIAST BP <80 MM HG: CPT | Performed by: NURSE PRACTITIONER

## 2020-08-10 PROCEDURE — 3008F BODY MASS INDEX DOCD: CPT | Performed by: NURSE PRACTITIONER

## 2020-08-10 PROCEDURE — 99243 OFF/OP CNSLTJ NEW/EST LOW 30: CPT | Performed by: NURSE PRACTITIONER

## 2020-08-10 NOTE — PROGRESS NOTES
HPI:    Patient ID: Fatimah Copeland is a 38year old male. HPI     Patient is a 38year old male who presents to the clinic for a consult regarding elevated PSA. Past medical history of PE, ocular myasthenia, myasthenia gravis.   Surgical history of a thy three  at lunch and three at dinner 240 tablet 12   • tiZANidine HCl 4 MG Oral Tab Take 1 tablet (4 mg total) by mouth 3 (three) times daily as needed.  20 tablet 0   • traZODone HCl 50 MG Oral Tab take 1/2 tablet by mouth in the evening (Patient not taking discussed. Patient verbalized understanding and was agreeable with plan of care. Follow up pending repeat PSA level.           Orders Placed This Encounter      PSA Diagnostic [E]      Meds This Visit:  Requested Prescriptions      No prescriptions re

## 2020-08-10 NOTE — PATIENT INSTRUCTIONS
Repeat PSA level in 2 weeks, please avoid any sexual stimulation for 3-5 days prior. If PSA remains elevated consider MRI prostate vs prostate biopsy. I will call you once I receive results to discuss recommendations at that time.

## 2020-08-24 ENCOUNTER — LAB ENCOUNTER (OUTPATIENT)
Dept: LAB | Age: 45
End: 2020-08-24
Attending: FAMILY MEDICINE
Payer: COMMERCIAL

## 2020-08-24 DIAGNOSIS — R74.8 ABNORMAL LIVER ENZYMES: ICD-10-CM

## 2020-08-24 DIAGNOSIS — R97.20 ELEVATED PSA: Primary | ICD-10-CM

## 2020-08-24 DIAGNOSIS — R97.20 ELEVATED PSA: ICD-10-CM

## 2020-08-24 LAB
ALBUMIN SERPL-MCNC: 3.6 G/DL (ref 3.4–5)
ALP LIVER SERPL-CCNC: 67 U/L (ref 45–117)
ALT SERPL-CCNC: 83 U/L (ref 16–61)
AST SERPL-CCNC: 40 U/L (ref 15–37)
BILIRUB DIRECT SERPL-MCNC: 0.2 MG/DL (ref 0–0.2)
BILIRUB SERPL-MCNC: 0.6 MG/DL (ref 0.1–2)
CERULOPLASMIN SERPL-MCNC: 25.3 MG/DL (ref 20–60)
HAV AB SER QL IA: NONREACTIVE
HBV CORE AB SERPL QL IA: NONREACTIVE
HBV SURFACE AB SER QL: NONREACTIVE
HBV SURFACE AB SERPL IA-ACNC: <3.1 MIU/ML
HBV SURFACE AG SERPL QL IA: NONREACTIVE
HCV AB SERPL QL IA: NONREACTIVE
M PROTEIN MFR SERPL ELPH: 6.7 G/DL (ref 6.4–8.2)
PSA SERPL-MCNC: 3.35 NG/ML (ref ?–4)

## 2020-08-24 PROCEDURE — 82390 ASSAY OF CERULOPLASMIN: CPT

## 2020-08-24 PROCEDURE — 86704 HEP B CORE ANTIBODY TOTAL: CPT

## 2020-08-24 PROCEDURE — 86708 HEPATITIS A ANTIBODY: CPT

## 2020-08-24 PROCEDURE — 80500 HEPATITIS A B + C PROFILE: CPT

## 2020-08-24 PROCEDURE — 86803 HEPATITIS C AB TEST: CPT

## 2020-08-24 PROCEDURE — 36415 COLL VENOUS BLD VENIPUNCTURE: CPT

## 2020-08-24 PROCEDURE — 86706 HEP B SURFACE ANTIBODY: CPT

## 2020-08-24 PROCEDURE — 86038 ANTINUCLEAR ANTIBODIES: CPT

## 2020-08-24 PROCEDURE — 80076 HEPATIC FUNCTION PANEL: CPT

## 2020-08-24 PROCEDURE — 87340 HEPATITIS B SURFACE AG IA: CPT

## 2020-08-24 PROCEDURE — 84153 ASSAY OF PSA TOTAL: CPT

## 2020-08-27 LAB — NUCLEAR IGG TITR SER IF: NEGATIVE {TITER}

## 2020-09-01 ENCOUNTER — VIRTUAL PHONE E/M (OUTPATIENT)
Dept: FAMILY MEDICINE CLINIC | Facility: CLINIC | Age: 45
End: 2020-09-01
Payer: COMMERCIAL

## 2020-09-01 DIAGNOSIS — R79.89 ABNORMAL LFTS: Primary | ICD-10-CM

## 2020-09-01 PROCEDURE — 99213 OFFICE O/P EST LOW 20 MIN: CPT | Performed by: FAMILY MEDICINE

## 2020-09-01 NOTE — PROGRESS NOTES
HPI:    Patient ID: Claudia Rudolph is a 38year old male. Here for f/u test results. psa better  lfts better  Decreased azathioprine and doing well  Pending visit with dr Shantanu Pinon. Review of Systems  Constitutional: Negative.   Negative for activity c [E]      Meds This Visit:  Requested Prescriptions      No prescriptions requested or ordered in this encounter       Imaging & Referrals:  None       #[id number]

## 2020-09-29 ENCOUNTER — OFFICE VISIT (OUTPATIENT)
Dept: NEUROLOGY | Facility: CLINIC | Age: 45
End: 2020-09-29
Payer: COMMERCIAL

## 2020-09-29 VITALS — BODY MASS INDEX: 29.8 KG/M2 | HEIGHT: 72 IN | WEIGHT: 220 LBS

## 2020-09-29 DIAGNOSIS — G56.21 ULNAR NEUROPATHY AT ELBOW OF RIGHT UPPER EXTREMITY: ICD-10-CM

## 2020-09-29 DIAGNOSIS — G70.00 MYASTHENIA GRAVIS (HCC): ICD-10-CM

## 2020-09-29 DIAGNOSIS — G70.00 MYASTHENIA GRAVIS, ACHR ANTIBODY POSITIVE (HCC): Primary | ICD-10-CM

## 2020-09-29 PROCEDURE — 3008F BODY MASS INDEX DOCD: CPT | Performed by: OTHER

## 2020-09-29 PROCEDURE — 99244 OFF/OP CNSLTJ NEW/EST MOD 40: CPT | Performed by: OTHER

## 2020-09-29 RX ORDER — AZATHIOPRINE 50 MG/1
100 TABLET ORAL 2 TIMES DAILY
Qty: 360 TABLET | Refills: 0 | Status: SHIPPED | OUTPATIENT
Start: 2020-09-29 | End: 2021-10-06

## 2020-09-29 RX ORDER — PYRIDOSTIGMINE BROMIDE 60 MG/1
TABLET ORAL
Qty: 240 TABLET | Refills: 12 | Status: SHIPPED | OUTPATIENT
Start: 2020-09-29

## 2020-09-29 RX ORDER — PYRIDOSTIGMINE BROMIDE 180 MG/1
180 TABLET, EXTENDED RELEASE ORAL EVERY EVENING
Qty: 90 TABLET | Refills: 0 | Status: SHIPPED | OUTPATIENT
Start: 2020-09-29 | End: 2021-03-22

## 2020-09-29 NOTE — PROGRESS NOTES
Mr Belem Damon, relates that he is changing neurologist to remain in that for, change of insurance. He states he was diagnosed with a thymoma 2012. He was diagnosed with myasthenia gravis in 2013. His last plasmapheresis was June 2018.   He cannot receive REGLA Ibuprofen 200 MG Oral Tab Take 200 mg by mouth every 6 (six) hours as needed. • tiZANidine HCl 4 MG Oral Tab Take 1 tablet (4 mg total) by mouth 3 (three) times daily as needed.  (Patient not taking: Reported on 9/29/2020 ) 20 tablet 0   • traZODone HCl Concerns:         Service: No        Blood Transfusions: Yes          plasma exchanges 2013        Caffeine Concern: Yes          coffee daily        Occupational Exposure: No        Hobby Hazards: No        Sleep Concern: No        Stress Concern: been referred for right ulnar nerve transposition by previous neurologist.  No repetitive induced weakness of cranial innervated muscles, neck flexors, upper or lower extremity muscles, proximal and distal.  Sensory: Intact to Vibration, temperature, fine

## 2021-03-19 DIAGNOSIS — G70.00 MYASTHENIA GRAVIS (HCC): ICD-10-CM

## 2021-03-19 NOTE — TELEPHONE ENCOUNTER
Refill request for PYRIDOSTIGMINE BROMIDE  MG Oral Tab CR, Take 1 tablet (180 mg total) by mouth every evening, 90 Tablets with 0 Refills    LOV: 9/29/20  NOV: None    Last Refilled: 9/29/20

## 2021-03-22 RX ORDER — PYRIDOSTIGMINE BROMIDE 180 MG/1
TABLET, EXTENDED RELEASE ORAL
Qty: 90 TABLET | Refills: 0 | Status: SHIPPED | OUTPATIENT
Start: 2021-03-22 | End: 2021-10-06

## 2021-06-10 ENCOUNTER — TELEMEDICINE (OUTPATIENT)
Dept: FAMILY MEDICINE CLINIC | Facility: CLINIC | Age: 46
End: 2021-06-10
Payer: MEDICAID

## 2021-06-10 DIAGNOSIS — J01.80 OTHER ACUTE SINUSITIS, RECURRENCE NOT SPECIFIED: Primary | ICD-10-CM

## 2021-06-10 PROCEDURE — 99213 OFFICE O/P EST LOW 20 MIN: CPT | Performed by: FAMILY MEDICINE

## 2021-06-10 RX ORDER — AMOXICILLIN 875 MG/1
875 TABLET, COATED ORAL 2 TIMES DAILY
Qty: 20 TABLET | Refills: 0 | Status: SHIPPED | OUTPATIENT
Start: 2021-06-10 | End: 2022-01-25

## 2021-06-10 NOTE — PROGRESS NOTES
HPI/Subjective:   Patient ID: Nirali Chou is a 54year old male.       Telehealth outside of [facility] [address] Verbal Consent   I conducted a telehealth visit with Nirali Chou today, 06/13/21, which was completed using two-way, real-time interactive a activity change, appetite change, diaphoresis and fatigue. HEENT see above   Respiratory: Negative. Negative for apnea, cough, chest tightness and shortness of breath. Cardiovascular: Negative. Negative for chest pain, palpitations and leg swelling.

## 2021-10-05 DIAGNOSIS — G70.00 MYASTHENIA GRAVIS (HCC): ICD-10-CM

## 2021-10-06 RX ORDER — PYRIDOSTIGMINE BROMIDE 180 MG/1
180 TABLET, EXTENDED RELEASE ORAL DAILY
Qty: 30 TABLET | Refills: 0 | Status: SHIPPED | OUTPATIENT
Start: 2021-10-06

## 2021-10-06 RX ORDER — AZATHIOPRINE 50 MG/1
TABLET ORAL
Qty: 120 TABLET | Refills: 0 | Status: SHIPPED | OUTPATIENT
Start: 2021-10-06

## 2021-10-06 NOTE — TELEPHONE ENCOUNTER
Refill request for mestinon  mg, take 1 tab daily, #30, no refills    Refill request for azathioprine 50 mg, 2 po BID, #120, no refills    LOV: 9/29/20  NOV: none

## 2022-01-25 ENCOUNTER — OFFICE VISIT (OUTPATIENT)
Dept: FAMILY MEDICINE CLINIC | Facility: CLINIC | Age: 47
End: 2022-01-25
Payer: MEDICAID

## 2022-01-25 VITALS
BODY MASS INDEX: 28.04 KG/M2 | HEIGHT: 72 IN | DIASTOLIC BLOOD PRESSURE: 66 MMHG | WEIGHT: 207 LBS | SYSTOLIC BLOOD PRESSURE: 102 MMHG | HEART RATE: 63 BPM

## 2022-01-25 DIAGNOSIS — G70.00 MYASTHENIA GRAVIS (HCC): ICD-10-CM

## 2022-01-25 DIAGNOSIS — N50.89 LUMP IN SCROTUM: ICD-10-CM

## 2022-01-25 DIAGNOSIS — M25.579 ANKLE PAIN, UNSPECIFIED CHRONICITY, UNSPECIFIED LATERALITY: ICD-10-CM

## 2022-01-25 DIAGNOSIS — R22.2 LUMP OF SKIN OF BACK: ICD-10-CM

## 2022-01-25 DIAGNOSIS — Z11.3 ROUTINE SCREENING FOR STI (SEXUALLY TRANSMITTED INFECTION): ICD-10-CM

## 2022-01-25 DIAGNOSIS — Z00.00 ANNUAL PHYSICAL EXAM: Primary | ICD-10-CM

## 2022-01-25 DIAGNOSIS — Z12.11 SCREENING FOR COLON CANCER: ICD-10-CM

## 2022-01-25 PROCEDURE — 99396 PREV VISIT EST AGE 40-64: CPT | Performed by: FAMILY MEDICINE

## 2022-01-25 PROCEDURE — 3074F SYST BP LT 130 MM HG: CPT | Performed by: FAMILY MEDICINE

## 2022-01-25 PROCEDURE — 3078F DIAST BP <80 MM HG: CPT | Performed by: FAMILY MEDICINE

## 2022-01-25 PROCEDURE — 3008F BODY MASS INDEX DOCD: CPT | Performed by: FAMILY MEDICINE

## 2022-01-26 ENCOUNTER — LAB ENCOUNTER (OUTPATIENT)
Dept: LAB | Age: 47
End: 2022-01-26
Attending: FAMILY MEDICINE
Payer: MEDICAID

## 2022-01-26 DIAGNOSIS — Z11.3 ROUTINE SCREENING FOR STI (SEXUALLY TRANSMITTED INFECTION): ICD-10-CM

## 2022-01-26 DIAGNOSIS — Z00.00 ANNUAL PHYSICAL EXAM: ICD-10-CM

## 2022-01-26 LAB
ALBUMIN SERPL-MCNC: 4 G/DL (ref 3.4–5)
ALBUMIN/GLOB SERPL: 1.5 {RATIO} (ref 1–2)
ALP LIVER SERPL-CCNC: 62 U/L
ALT SERPL-CCNC: 47 U/L
ANION GAP SERPL CALC-SCNC: 4 MMOL/L (ref 0–18)
AST SERPL-CCNC: 24 U/L (ref 15–37)
BASOPHILS # BLD AUTO: 0.06 X10(3) UL (ref 0–0.2)
BASOPHILS NFR BLD AUTO: 1.2 %
BILIRUB SERPL-MCNC: 0.5 MG/DL (ref 0.1–2)
BUN BLD-MCNC: 10 MG/DL (ref 7–18)
BUN/CREAT SERPL: 11.4 (ref 10–20)
CALCIUM BLD-MCNC: 9.1 MG/DL (ref 8.5–10.1)
CHLORIDE SERPL-SCNC: 107 MMOL/L (ref 98–112)
CHOLEST SERPL-MCNC: 174 MG/DL (ref ?–200)
CO2 SERPL-SCNC: 31 MMOL/L (ref 21–32)
CREAT BLD-MCNC: 0.88 MG/DL
DEPRECATED RDW RBC AUTO: 46.1 FL (ref 35.1–46.3)
EOSINOPHIL # BLD AUTO: 0.17 X10(3) UL (ref 0–0.7)
EOSINOPHIL NFR BLD AUTO: 3.4 %
ERYTHROCYTE [DISTWIDTH] IN BLOOD BY AUTOMATED COUNT: 13.1 % (ref 11–15)
FASTING PATIENT LIPID ANSWER: YES
FASTING STATUS PATIENT QL REPORTED: YES
GLOBULIN PLAS-MCNC: 2.6 G/DL (ref 2.8–4.4)
GLUCOSE BLD-MCNC: 82 MG/DL (ref 70–99)
HCT VFR BLD AUTO: 46.4 %
HCV AB SERPL QL IA: NONREACTIVE
HDLC SERPL-MCNC: 59 MG/DL (ref 40–59)
HGB BLD-MCNC: 15.4 G/DL
IMM GRANULOCYTES # BLD AUTO: 0.01 X10(3) UL (ref 0–1)
IMM GRANULOCYTES NFR BLD: 0.2 %
LDLC SERPL CALC-MCNC: 98 MG/DL (ref ?–100)
LYMPHOCYTES # BLD AUTO: 1.08 X10(3) UL (ref 1–4)
LYMPHOCYTES NFR BLD AUTO: 21.4 %
MCH RBC QN AUTO: 31.6 PG (ref 26–34)
MCHC RBC AUTO-ENTMCNC: 33.2 G/DL (ref 31–37)
MCV RBC AUTO: 95.3 FL
MONOCYTES # BLD AUTO: 0.62 X10(3) UL (ref 0.1–1)
MONOCYTES NFR BLD AUTO: 12.3 %
NEUTROPHILS # BLD AUTO: 3.1 X10 (3) UL (ref 1.5–7.7)
NEUTROPHILS # BLD AUTO: 3.1 X10(3) UL (ref 1.5–7.7)
NEUTROPHILS NFR BLD AUTO: 61.5 %
NONHDLC SERPL-MCNC: 115 MG/DL (ref ?–130)
OSMOLALITY SERPL CALC.SUM OF ELEC: 292 MOSM/KG (ref 275–295)
PLATELET # BLD AUTO: 146 10(3)UL (ref 150–450)
POTASSIUM SERPL-SCNC: 4.2 MMOL/L (ref 3.5–5.1)
PROT SERPL-MCNC: 6.6 G/DL (ref 6.4–8.2)
RBC # BLD AUTO: 4.87 X10(6)UL
SODIUM SERPL-SCNC: 142 MMOL/L (ref 136–145)
TRIGL SERPL-MCNC: 92 MG/DL (ref 30–149)
TSI SER-ACNC: 0.71 MIU/ML (ref 0.36–3.74)
VLDLC SERPL CALC-MCNC: 15 MG/DL (ref 0–30)
WBC # BLD AUTO: 5 X10(3) UL (ref 4–11)

## 2022-01-26 PROCEDURE — 80061 LIPID PANEL: CPT

## 2022-01-26 PROCEDURE — 80053 COMPREHEN METABOLIC PANEL: CPT

## 2022-01-26 PROCEDURE — 86803 HEPATITIS C AB TEST: CPT

## 2022-01-26 PROCEDURE — 87491 CHLMYD TRACH DNA AMP PROBE: CPT

## 2022-01-26 PROCEDURE — 87591 N.GONORRHOEAE DNA AMP PROB: CPT

## 2022-01-26 PROCEDURE — 85025 COMPLETE CBC W/AUTO DIFF WBC: CPT

## 2022-01-26 PROCEDURE — 87389 HIV-1 AG W/HIV-1&-2 AB AG IA: CPT

## 2022-01-26 PROCEDURE — 84443 ASSAY THYROID STIM HORMONE: CPT

## 2022-01-26 PROCEDURE — 36415 COLL VENOUS BLD VENIPUNCTURE: CPT

## 2022-01-26 PROCEDURE — 86780 TREPONEMA PALLIDUM: CPT

## 2022-01-27 LAB
C TRACH DNA SPEC QL NAA+PROBE: NEGATIVE
N GONORRHOEA DNA SPEC QL NAA+PROBE: NEGATIVE

## 2022-01-28 LAB — T PALLIDUM AB SER QL: NEGATIVE

## 2022-02-01 ENCOUNTER — PATIENT MESSAGE (OUTPATIENT)
Dept: FAMILY MEDICINE CLINIC | Facility: CLINIC | Age: 47
End: 2022-02-01

## 2022-02-01 NOTE — PROGRESS NOTES
Subjective:   Patient ID: Tyler Wesley is a 51year old male. Patient here for annual physical  Also has several complaints - lump in scrotum, lump skin on the back.    Needs screening sti,   Had some injury achiles tendon has pain now there         His oriented to person, place, and time. Deep Tendon Reflexes: Reflexes are normal and symmetric.        MSK mild tenderness achiles tendonn but its intact  Assessment & Plan:   Annual physical exam  (primary encounter diagnosis)  Routine screening for STI

## 2022-02-03 ENCOUNTER — OFFICE VISIT (OUTPATIENT)
Dept: SURGERY | Facility: CLINIC | Age: 47
End: 2022-02-03
Payer: MEDICAID

## 2022-02-03 ENCOUNTER — LAB ENCOUNTER (OUTPATIENT)
Dept: LAB | Facility: HOSPITAL | Age: 47
End: 2022-02-03
Attending: NURSE PRACTITIONER
Payer: MEDICAID

## 2022-02-03 VITALS
HEART RATE: 57 BPM | HEIGHT: 72 IN | WEIGHT: 207 LBS | SYSTOLIC BLOOD PRESSURE: 115 MMHG | BODY MASS INDEX: 28.04 KG/M2 | DIASTOLIC BLOOD PRESSURE: 71 MMHG

## 2022-02-03 DIAGNOSIS — Z12.5 SCREENING PSA (PROSTATE SPECIFIC ANTIGEN): ICD-10-CM

## 2022-02-03 DIAGNOSIS — N50.89 TESTICULAR LUMP: ICD-10-CM

## 2022-02-03 DIAGNOSIS — N50.89 TESTICULAR LUMP: Primary | ICD-10-CM

## 2022-02-03 LAB
BILIRUB UR QL: NEGATIVE
COLOR UR: YELLOW
COMPLEXED PSA SERPL-MCNC: 4.25 NG/ML (ref ?–4)
GLUCOSE UR-MCNC: NEGATIVE MG/DL
HGB UR QL STRIP.AUTO: NEGATIVE
NITRITE UR QL STRIP.AUTO: NEGATIVE
PH UR: 7 [PH] (ref 5–8)
PROT UR-MCNC: NEGATIVE MG/DL
SP GR UR STRIP: 1.02 (ref 1–1.03)
UROBILINOGEN UR STRIP-ACNC: 2

## 2022-02-03 PROCEDURE — 3074F SYST BP LT 130 MM HG: CPT | Performed by: NURSE PRACTITIONER

## 2022-02-03 PROCEDURE — 36415 COLL VENOUS BLD VENIPUNCTURE: CPT

## 2022-02-03 PROCEDURE — 87086 URINE CULTURE/COLONY COUNT: CPT

## 2022-02-03 PROCEDURE — 3078F DIAST BP <80 MM HG: CPT | Performed by: NURSE PRACTITIONER

## 2022-02-03 PROCEDURE — 99213 OFFICE O/P EST LOW 20 MIN: CPT | Performed by: NURSE PRACTITIONER

## 2022-02-03 PROCEDURE — 81003 URINALYSIS AUTO W/O SCOPE: CPT

## 2022-02-03 PROCEDURE — 3008F BODY MASS INDEX DOCD: CPT | Performed by: NURSE PRACTITIONER

## 2022-02-10 ENCOUNTER — OFFICE VISIT (OUTPATIENT)
Dept: NEUROLOGY | Facility: CLINIC | Age: 47
End: 2022-02-10
Payer: MEDICAID

## 2022-02-10 VITALS
WEIGHT: 207 LBS | BODY MASS INDEX: 28.04 KG/M2 | HEIGHT: 72 IN | DIASTOLIC BLOOD PRESSURE: 71 MMHG | SYSTOLIC BLOOD PRESSURE: 115 MMHG

## 2022-02-10 DIAGNOSIS — G70.00 MYASTHENIA GRAVIS (HCC): Primary | ICD-10-CM

## 2022-02-10 PROCEDURE — 3074F SYST BP LT 130 MM HG: CPT | Performed by: OTHER

## 2022-02-10 PROCEDURE — 3078F DIAST BP <80 MM HG: CPT | Performed by: OTHER

## 2022-02-10 PROCEDURE — 3008F BODY MASS INDEX DOCD: CPT | Performed by: OTHER

## 2022-02-10 PROCEDURE — 99214 OFFICE O/P EST MOD 30 MIN: CPT | Performed by: OTHER

## 2022-02-10 RX ORDER — PYRIDOSTIGMINE BROMIDE 60 MG/1
TABLET ORAL
Qty: 240 TABLET | Refills: 12 | Status: SHIPPED | OUTPATIENT
Start: 2022-02-10

## 2022-02-10 RX ORDER — PYRIDOSTIGMINE BROMIDE 180 MG/1
180 TABLET, EXTENDED RELEASE ORAL DAILY
Qty: 30 TABLET | Refills: 3 | Status: SHIPPED | OUTPATIENT
Start: 2022-02-10

## 2022-02-10 RX ORDER — AZATHIOPRINE 50 MG/1
100 TABLET ORAL 2 TIMES DAILY
Qty: 120 TABLET | Refills: 3 | Status: SHIPPED | OUTPATIENT
Start: 2022-02-10

## 2022-02-14 ENCOUNTER — OFFICE VISIT (OUTPATIENT)
Dept: PHYSICAL THERAPY | Facility: HOSPITAL | Age: 47
End: 2022-02-14
Attending: FAMILY MEDICINE
Payer: MEDICAID

## 2022-02-14 DIAGNOSIS — M25.579 ANKLE PAIN, UNSPECIFIED CHRONICITY, UNSPECIFIED LATERALITY: ICD-10-CM

## 2022-02-14 PROCEDURE — 97110 THERAPEUTIC EXERCISES: CPT

## 2022-02-14 PROCEDURE — 97161 PT EVAL LOW COMPLEX 20 MIN: CPT

## 2022-02-17 ENCOUNTER — OFFICE VISIT (OUTPATIENT)
Dept: PHYSICAL THERAPY | Facility: HOSPITAL | Age: 47
End: 2022-02-17
Attending: FAMILY MEDICINE
Payer: MEDICAID

## 2022-02-17 PROCEDURE — 97140 MANUAL THERAPY 1/> REGIONS: CPT

## 2022-02-17 PROCEDURE — 97112 NEUROMUSCULAR REEDUCATION: CPT

## 2022-02-17 PROCEDURE — 97110 THERAPEUTIC EXERCISES: CPT

## 2022-02-21 ENCOUNTER — OFFICE VISIT (OUTPATIENT)
Dept: PHYSICAL THERAPY | Facility: HOSPITAL | Age: 47
End: 2022-02-21
Attending: FAMILY MEDICINE
Payer: MEDICAID

## 2022-02-21 PROCEDURE — 97110 THERAPEUTIC EXERCISES: CPT

## 2022-02-21 PROCEDURE — 97112 NEUROMUSCULAR REEDUCATION: CPT

## 2022-02-21 PROCEDURE — 97140 MANUAL THERAPY 1/> REGIONS: CPT

## 2022-02-24 ENCOUNTER — OFFICE VISIT (OUTPATIENT)
Dept: PHYSICAL THERAPY | Facility: HOSPITAL | Age: 47
End: 2022-02-24
Attending: FAMILY MEDICINE
Payer: MEDICAID

## 2022-02-24 PROCEDURE — 97110 THERAPEUTIC EXERCISES: CPT

## 2022-02-24 PROCEDURE — 97140 MANUAL THERAPY 1/> REGIONS: CPT

## 2022-02-28 ENCOUNTER — OFFICE VISIT (OUTPATIENT)
Dept: PHYSICAL THERAPY | Facility: HOSPITAL | Age: 47
End: 2022-02-28
Attending: FAMILY MEDICINE
Payer: MEDICAID

## 2022-02-28 PROCEDURE — 97140 MANUAL THERAPY 1/> REGIONS: CPT

## 2022-02-28 PROCEDURE — 97112 NEUROMUSCULAR REEDUCATION: CPT

## 2022-02-28 PROCEDURE — 97110 THERAPEUTIC EXERCISES: CPT

## 2022-03-03 ENCOUNTER — OFFICE VISIT (OUTPATIENT)
Dept: PHYSICAL THERAPY | Facility: HOSPITAL | Age: 47
End: 2022-03-03
Attending: FAMILY MEDICINE
Payer: MEDICAID

## 2022-03-03 PROCEDURE — 97112 NEUROMUSCULAR REEDUCATION: CPT

## 2022-03-03 PROCEDURE — 97110 THERAPEUTIC EXERCISES: CPT

## 2022-03-03 PROCEDURE — 97140 MANUAL THERAPY 1/> REGIONS: CPT

## 2022-03-07 ENCOUNTER — OFFICE VISIT (OUTPATIENT)
Dept: PHYSICAL THERAPY | Facility: HOSPITAL | Age: 47
End: 2022-03-07
Attending: FAMILY MEDICINE
Payer: MEDICAID

## 2022-03-07 ENCOUNTER — HOSPITAL ENCOUNTER (OUTPATIENT)
Dept: ULTRASOUND IMAGING | Facility: HOSPITAL | Age: 47
Discharge: HOME | End: 2022-03-07
Attending: NURSE PRACTITIONER
Payer: MEDICAID

## 2022-03-07 DIAGNOSIS — N50.89 TESTICULAR LUMP: ICD-10-CM

## 2022-03-07 PROCEDURE — 76870 US EXAM SCROTUM: CPT | Performed by: NURSE PRACTITIONER

## 2022-03-07 PROCEDURE — 93975 VASCULAR STUDY: CPT | Performed by: NURSE PRACTITIONER

## 2022-03-07 PROCEDURE — 97140 MANUAL THERAPY 1/> REGIONS: CPT

## 2022-03-07 PROCEDURE — 97110 THERAPEUTIC EXERCISES: CPT

## 2022-03-07 PROCEDURE — 97112 NEUROMUSCULAR REEDUCATION: CPT

## 2022-03-10 ENCOUNTER — OFFICE VISIT (OUTPATIENT)
Dept: PHYSICAL THERAPY | Facility: HOSPITAL | Age: 47
End: 2022-03-10
Attending: FAMILY MEDICINE
Payer: MEDICAID

## 2022-03-10 PROCEDURE — 97140 MANUAL THERAPY 1/> REGIONS: CPT

## 2022-03-10 PROCEDURE — 97110 THERAPEUTIC EXERCISES: CPT

## 2022-03-10 PROCEDURE — 97112 NEUROMUSCULAR REEDUCATION: CPT

## 2022-03-14 ENCOUNTER — TELEPHONE (OUTPATIENT)
Dept: GASTROENTEROLOGY | Facility: CLINIC | Age: 47
End: 2022-03-14

## 2022-03-14 ENCOUNTER — OFFICE VISIT (OUTPATIENT)
Dept: GASTROENTEROLOGY | Facility: CLINIC | Age: 47
End: 2022-03-14
Payer: MEDICAID

## 2022-03-14 VITALS — DIASTOLIC BLOOD PRESSURE: 65 MMHG | BODY MASS INDEX: 29 KG/M2 | WEIGHT: 211 LBS | SYSTOLIC BLOOD PRESSURE: 102 MMHG

## 2022-03-14 DIAGNOSIS — Z12.11 SCREENING FOR COLON CANCER: Primary | ICD-10-CM

## 2022-03-14 PROCEDURE — 99243 OFF/OP CNSLTJ NEW/EST LOW 30: CPT | Performed by: NURSE PRACTITIONER

## 2022-03-14 PROCEDURE — 3078F DIAST BP <80 MM HG: CPT | Performed by: NURSE PRACTITIONER

## 2022-03-14 PROCEDURE — 3074F SYST BP LT 130 MM HG: CPT | Performed by: NURSE PRACTITIONER

## 2022-03-14 RX ORDER — POLYETHYLENE GLYCOL 3350, SODIUM CHLORIDE, SODIUM BICARBONATE, POTASSIUM CHLORIDE 420; 11.2; 5.72; 1.48 G/4L; G/4L; G/4L; G/4L
POWDER, FOR SOLUTION ORAL
Qty: 4000 ML | Refills: 0 | Status: SHIPPED | OUTPATIENT
Start: 2022-03-14

## 2022-03-14 NOTE — PATIENT INSTRUCTIONS
-Schedule colonoscopy w/ Dr. Ingrid Parada or Dr. Blaze Cuellar with  MAC  Dx: screening   -Eligible for NE: No r/t medical hx  -Prep: Split dose Colyte/TriLyte or equivalent  -Anti-platelets and anti-coagulants: none  -Diabetes meds: none    ** If MAC:    - HOLD ACE/ARBs the night before and/or the day of the procedure(s) - N/A   - NO alcohol, recreational drugs nor erectile dysfunction medications 24 hours before procedure(s)   - NO herbal supplements or weight loss medications (phentermine/Vyvanse/Adderall)  x 7 days prior to the procedure(s)    ** If MAC @ [facility] or IV twilit - continue all medications as prescribed    ** COVID-19 testing required 72 hours prior to procedure

## 2022-03-14 NOTE — TELEPHONE ENCOUNTER
Scheduled for:  Colonoscopy 81880  Provider Name:  Dr Madelyn Castillo  Date:  08/9/22  Location:[facility]    Sedation: MAC  Time:  1000 (pt is aware to arrive at 0900)  Prep:  Colyte  Meds/Allergies Reconciled?:  Physician reviewed  Diagnosis with codes:  Colon cancer screening z12.11  Was patient informed to call insurance with codes (Y/N):  y     Referral sent?:  na  EM or [facility] notified?:  I sent an electronic request to Endo Scheduling and received a confirmation today. Medication Orders:  Pt is aware to NOT take iron pills, herbal meds and diet supplements for 7 days before exam. Also to NOT take any form of alcohol, recreational drugs and any forms of ED meds 24 hours before exam.     Misc Orders:       Further instructions given by staff:  I discussed the prep intructions with the patient in office which he verbally understood. Copy of instructions was handed to patient as well. Patient was also advised he will receive a call from PAT nurse 72-24 hours prior procedure to schedule Covid test done.

## 2022-03-21 ENCOUNTER — OFFICE VISIT (OUTPATIENT)
Dept: PHYSICAL THERAPY | Facility: HOSPITAL | Age: 47
End: 2022-03-21
Attending: FAMILY MEDICINE
Payer: MEDICAID

## 2022-03-21 PROCEDURE — 97110 THERAPEUTIC EXERCISES: CPT

## 2022-03-21 PROCEDURE — 97140 MANUAL THERAPY 1/> REGIONS: CPT

## 2022-03-24 ENCOUNTER — OFFICE VISIT (OUTPATIENT)
Dept: PHYSICAL THERAPY | Facility: HOSPITAL | Age: 47
End: 2022-03-24
Attending: FAMILY MEDICINE
Payer: MEDICAID

## 2022-03-24 PROCEDURE — 97110 THERAPEUTIC EXERCISES: CPT

## 2022-03-24 PROCEDURE — 97140 MANUAL THERAPY 1/> REGIONS: CPT

## 2022-03-28 ENCOUNTER — OFFICE VISIT (OUTPATIENT)
Dept: PHYSICAL THERAPY | Facility: HOSPITAL | Age: 47
End: 2022-03-28
Attending: FAMILY MEDICINE
Payer: MEDICAID

## 2022-03-28 PROCEDURE — 97110 THERAPEUTIC EXERCISES: CPT

## 2022-03-28 PROCEDURE — 97140 MANUAL THERAPY 1/> REGIONS: CPT

## 2022-03-31 ENCOUNTER — OFFICE VISIT (OUTPATIENT)
Dept: PHYSICAL THERAPY | Facility: HOSPITAL | Age: 47
End: 2022-03-31
Attending: FAMILY MEDICINE
Payer: MEDICAID

## 2022-03-31 PROCEDURE — 97110 THERAPEUTIC EXERCISES: CPT

## 2022-03-31 PROCEDURE — 97112 NEUROMUSCULAR REEDUCATION: CPT

## 2022-03-31 PROCEDURE — 97140 MANUAL THERAPY 1/> REGIONS: CPT

## 2022-04-11 ENCOUNTER — APPOINTMENT (OUTPATIENT)
Dept: PHYSICAL THERAPY | Facility: HOSPITAL | Age: 47
End: 2022-04-11
Attending: FAMILY MEDICINE
Payer: MEDICAID

## 2022-04-11 PROCEDURE — 97112 NEUROMUSCULAR REEDUCATION: CPT

## 2022-04-11 PROCEDURE — 97110 THERAPEUTIC EXERCISES: CPT

## 2022-04-11 PROCEDURE — 97140 MANUAL THERAPY 1/> REGIONS: CPT

## 2022-04-14 ENCOUNTER — OFFICE VISIT (OUTPATIENT)
Dept: PHYSICAL THERAPY | Facility: HOSPITAL | Age: 47
End: 2022-04-14
Attending: FAMILY MEDICINE
Payer: MEDICAID

## 2022-04-14 PROCEDURE — 97110 THERAPEUTIC EXERCISES: CPT

## 2022-04-14 PROCEDURE — 97112 NEUROMUSCULAR REEDUCATION: CPT

## 2022-04-14 PROCEDURE — 97140 MANUAL THERAPY 1/> REGIONS: CPT

## 2022-05-02 ENCOUNTER — OFFICE VISIT (OUTPATIENT)
Dept: PHYSICAL THERAPY | Facility: HOSPITAL | Age: 47
End: 2022-05-02
Attending: FAMILY MEDICINE
Payer: MEDICAID

## 2022-05-02 PROCEDURE — 97110 THERAPEUTIC EXERCISES: CPT

## 2022-06-28 ENCOUNTER — HOSPITAL ENCOUNTER (OUTPATIENT)
Dept: GENERAL RADIOLOGY | Facility: HOSPITAL | Age: 47
Discharge: HOME | End: 2022-06-28
Attending: FAMILY MEDICINE
Payer: MEDICAID

## 2022-06-28 ENCOUNTER — TELEMEDICINE (OUTPATIENT)
Dept: FAMILY MEDICINE CLINIC | Facility: CLINIC | Age: 47
End: 2022-06-28

## 2022-06-28 DIAGNOSIS — M25.562 ACUTE PAIN OF LEFT KNEE: Primary | ICD-10-CM

## 2022-06-28 DIAGNOSIS — M25.562 ACUTE PAIN OF LEFT KNEE: ICD-10-CM

## 2022-06-28 PROCEDURE — 73562 X-RAY EXAM OF KNEE 3: CPT | Performed by: FAMILY MEDICINE

## 2022-06-28 PROCEDURE — 99213 OFFICE O/P EST LOW 20 MIN: CPT | Performed by: FAMILY MEDICINE

## 2022-06-29 ENCOUNTER — HOSPITAL ENCOUNTER (OUTPATIENT)
Dept: ULTRASOUND IMAGING | Facility: HOSPITAL | Age: 47
Discharge: HOME | End: 2022-06-29
Attending: FAMILY MEDICINE
Payer: MEDICAID

## 2022-06-29 ENCOUNTER — MOBILE ENCOUNTER (OUTPATIENT)
Dept: FAMILY MEDICINE CLINIC | Facility: CLINIC | Age: 47
End: 2022-06-29

## 2022-06-29 ENCOUNTER — OFFICE VISIT (OUTPATIENT)
Dept: FAMILY MEDICINE CLINIC | Facility: CLINIC | Age: 47
End: 2022-06-29
Payer: MEDICAID

## 2022-06-29 VITALS
WEIGHT: 211 LBS | HEART RATE: 59 BPM | DIASTOLIC BLOOD PRESSURE: 66 MMHG | HEIGHT: 72 IN | BODY MASS INDEX: 28.58 KG/M2 | SYSTOLIC BLOOD PRESSURE: 109 MMHG

## 2022-06-29 DIAGNOSIS — R60.9 EDEMA, UNSPECIFIED TYPE: Primary | ICD-10-CM

## 2022-06-29 DIAGNOSIS — R60.9 EDEMA, UNSPECIFIED TYPE: ICD-10-CM

## 2022-06-29 DIAGNOSIS — S80.12XA HEMATOMA OF LEFT LOWER LEG: Primary | ICD-10-CM

## 2022-06-29 PROCEDURE — 3078F DIAST BP <80 MM HG: CPT | Performed by: FAMILY MEDICINE

## 2022-06-29 PROCEDURE — 3074F SYST BP LT 130 MM HG: CPT | Performed by: FAMILY MEDICINE

## 2022-06-29 PROCEDURE — 93971 EXTREMITY STUDY: CPT | Performed by: FAMILY MEDICINE

## 2022-06-29 PROCEDURE — 3008F BODY MASS INDEX DOCD: CPT | Performed by: FAMILY MEDICINE

## 2022-06-29 PROCEDURE — 99213 OFFICE O/P EST LOW 20 MIN: CPT | Performed by: FAMILY MEDICINE

## 2022-08-01 NOTE — PAT NURSING NOTE
Pt.said he tested positive for COVID about a month ago and had symptoms of a cough, chills, body aches and wekaness. He doesn't recall exact date and said he can look it up and let us tomorrow.

## 2022-08-02 NOTE — PAT NURSING NOTE
Pt informed RN that he tested positive for Covid on 7/8/2022 through remote testing. Symptoms include chills, cough, and body aches. Currently denies having any covid-like symptoms. Per Guidelines for Scheduling Procedures, pt. Able to proceed with his colonoscopy on 8/9/2022 with Dr. Radha Rashid. Jami (Infection Control) confirmed pt able to proceed with his colonoscopy.

## 2022-08-09 ENCOUNTER — ANESTHESIA (OUTPATIENT)
Dept: ENDOSCOPY | Facility: HOSPITAL | Age: 47
End: 2022-08-09
Payer: MEDICAID

## 2022-08-09 ENCOUNTER — HOSPITAL ENCOUNTER (OUTPATIENT)
Facility: HOSPITAL | Age: 47
Setting detail: HOSPITAL OUTPATIENT SURGERY
Discharge: HOME | End: 2022-08-09
Attending: INTERNAL MEDICINE | Admitting: INTERNAL MEDICINE
Payer: MEDICAID

## 2022-08-09 ENCOUNTER — ANESTHESIA EVENT (OUTPATIENT)
Dept: ENDOSCOPY | Facility: HOSPITAL | Age: 47
End: 2022-08-09
Payer: MEDICAID

## 2022-08-09 VITALS
TEMPERATURE: 98 F | RESPIRATION RATE: 15 BRPM | HEIGHT: 72 IN | SYSTOLIC BLOOD PRESSURE: 89 MMHG | DIASTOLIC BLOOD PRESSURE: 61 MMHG | HEART RATE: 56 BPM | BODY MASS INDEX: 26.01 KG/M2 | OXYGEN SATURATION: 98 % | WEIGHT: 192 LBS

## 2022-08-09 DIAGNOSIS — Z12.11 COLON CANCER SCREENING: ICD-10-CM

## 2022-08-09 PROCEDURE — 45385 COLONOSCOPY W/LESION REMOVAL: CPT | Performed by: INTERNAL MEDICINE

## 2022-08-09 PROCEDURE — 0DBL8ZX EXCISION OF TRANSVERSE COLON, VIA NATURAL OR ARTIFICIAL OPENING ENDOSCOPIC, DIAGNOSTIC: ICD-10-PCS | Performed by: INTERNAL MEDICINE

## 2022-08-09 PROCEDURE — 0DBN8ZX EXCISION OF SIGMOID COLON, VIA NATURAL OR ARTIFICIAL OPENING ENDOSCOPIC, DIAGNOSTIC: ICD-10-PCS | Performed by: INTERNAL MEDICINE

## 2022-08-09 RX ORDER — SODIUM CHLORIDE, SODIUM LACTATE, POTASSIUM CHLORIDE, CALCIUM CHLORIDE 600; 310; 30; 20 MG/100ML; MG/100ML; MG/100ML; MG/100ML
INJECTION, SOLUTION INTRAVENOUS CONTINUOUS
Status: DISCONTINUED | OUTPATIENT
Start: 2022-08-09 | End: 2022-08-09

## 2022-08-09 RX ORDER — LIDOCAINE HYDROCHLORIDE 10 MG/ML
INJECTION, SOLUTION EPIDURAL; INFILTRATION; INTRACAUDAL; PERINEURAL AS NEEDED
Status: DISCONTINUED | OUTPATIENT
Start: 2022-08-09 | End: 2022-08-09 | Stop reason: SURG

## 2022-08-09 RX ORDER — SODIUM CHLORIDE, SODIUM LACTATE, POTASSIUM CHLORIDE, CALCIUM CHLORIDE 600; 310; 30; 20 MG/100ML; MG/100ML; MG/100ML; MG/100ML
INJECTION, SOLUTION INTRAVENOUS CONTINUOUS
OUTPATIENT
Start: 2022-08-09

## 2022-08-09 RX ORDER — NALOXONE HYDROCHLORIDE 0.4 MG/ML
80 INJECTION, SOLUTION INTRAMUSCULAR; INTRAVENOUS; SUBCUTANEOUS AS NEEDED
OUTPATIENT
Start: 2022-08-09 | End: 2022-08-09

## 2022-08-09 RX ADMIN — SODIUM CHLORIDE, SODIUM LACTATE, POTASSIUM CHLORIDE, CALCIUM CHLORIDE: 600; 310; 30; 20 INJECTION, SOLUTION INTRAVENOUS at 11:16:00

## 2022-08-09 RX ADMIN — LIDOCAINE HYDROCHLORIDE 50 MG: 10 INJECTION, SOLUTION EPIDURAL; INFILTRATION; INTRACAUDAL; PERINEURAL at 10:43:00

## 2022-08-09 NOTE — OPERATIVE REPORT
[facility] Endoscopy Report      Date of Procedure:  08/09/22      Preoperative Diagnosis:  Colorectal cancer screening      Postoperative Diagnosis:  1. Colon polyps  2. Sigmoid colon diverticulosis  3. Poor colonoscopic preparation      Procedure:    Colonoscopy with polypectomy      Surgeon:  Poncho Giraldo M.D. Anesthesia:  Monitored anesthesia care  Cecal withdrawal time: 20 minutes  EBL:  Insignificant      Brief History: This is a 51year old male who presents for a screening colonoscopy. He has had no lower gastrointestinal tract symptoms, signs or family history of colorectal cancer. Technique:  After informed consent, the patient was placed in the left lateral recumbent position. Digital rectal examination revealed no palpable intraluminal abnormalities. An Olympus variable stiffness 190 series HD colonoscope was inserted into the rectum and advanced under direct vision by following the lumen through a tortuous left colon to the cecum confirmed by landmarks of the ileocecal valve and appendiceal orifice. The colon was examined upon withdrawal in the left lateral recumbent position. Findings:  The preparation of the colon was poor. There was thick inspissated bile-stained material present especially in the right colon that was impossible to irrigate free. Scattered areas of inspissated bile-stained and mucoid material were scattered in other areas of the colon as well. The preparation is therefore deemed suboptimal. The visualized colonic mucosa from the cecum to the anal verge was normal with an intact vascular pattern. There were #2 5-7 mm sessile adenomatous appearing polyps in the proximal transverse colon and in the proximal sigmoid each of which was cold snare excised and retrieved. No ongoing bleeding. There were multiple diverticula seen in the sigmoid colon along with an occasional mucosal hemorrhage and no signs of complication.   There were no other colonic polyps, mass lesions, vascular anomalies or signs of inflammation seen. Retroflexion in the rectum revealed no abnormalities. The procedure was well tolerated without immediate complication. Impression:  1. Colon polyps  2. Sigmoid colon diverticulosis  3. Poor colonoscopic preparation    Recommendations:  1. High-fiber diet. 2.  Follow-up biopsy results. 3.  Surveillance colonoscopy within 1 year in light of the preparation and probable adenomatous polyps.         Bi Chen MD  8/9/2022

## 2022-08-15 ENCOUNTER — HOSPITAL ENCOUNTER (OUTPATIENT)
Dept: GENERAL RADIOLOGY | Facility: HOSPITAL | Age: 47
Discharge: HOME | End: 2022-08-15
Attending: ORTHOPAEDIC SURGERY
Payer: MEDICAID

## 2022-08-15 ENCOUNTER — OFFICE VISIT (OUTPATIENT)
Dept: ORTHOPEDICS CLINIC | Facility: CLINIC | Age: 47
End: 2022-08-15
Payer: MEDICAID

## 2022-08-15 ENCOUNTER — TELEPHONE (OUTPATIENT)
Dept: ORTHOPEDICS CLINIC | Facility: CLINIC | Age: 47
End: 2022-08-15

## 2022-08-15 DIAGNOSIS — S83.412A SPRAIN OF MEDIAL COLLATERAL LIGAMENT OF LEFT KNEE, INITIAL ENCOUNTER: Primary | ICD-10-CM

## 2022-08-15 DIAGNOSIS — M25.562 ACUTE PAIN OF LEFT KNEE: ICD-10-CM

## 2022-08-15 DIAGNOSIS — S89.92XA KNEE INJURY, LEFT, INITIAL ENCOUNTER: ICD-10-CM

## 2022-08-15 PROCEDURE — 73562 X-RAY EXAM OF KNEE 3: CPT | Performed by: ORTHOPAEDIC SURGERY

## 2022-08-15 NOTE — TELEPHONE ENCOUNTER
merle from out pt central scheduling called. Pt called to schedule the mri of the left knee. Order is the right knee. Please update the order.

## 2022-08-17 ENCOUNTER — TELEPHONE (OUTPATIENT)
Dept: GASTROENTEROLOGY | Facility: CLINIC | Age: 47
End: 2022-08-17

## 2022-08-17 NOTE — TELEPHONE ENCOUNTER
I spoke to the patient regarding colonoscopy. I  reviewed recommendations and follow up instructions below. Patient voiced understanding and all of his questions were answered. Patient aware that because of poor prep that it is recommended to have a colonoscopy in a year.

## 2022-08-17 NOTE — TELEPHONE ENCOUNTER
Health maintenance updated and message sent to patient outreach to complete colonoscopy in 1 year.    Due: 8/9/ 23

## 2022-08-17 NOTE — TELEPHONE ENCOUNTER
----- Message from Landy Jimenez MD sent at 8/16/2022  6:36 PM CDT -----  Please see the following [employer] message sent to the patient: \"Onur Villalobos tried calling your mobile phone. Your colonoscopy revealed #2 small polyps which were removed. The polyps were adenomas. These are the types of polyps that have a potential to become a tumor or cancer, however, at this stage were small, benign and completely removed. Most polyps of this size and type do not progress to cancer. Uncomplicated diverticulosis was present (pockets in the wall of the colon) as well. A small percentage of patients with diverticulosis will develop inflammation of the pockets called diverticulitis. This presents with abdominal pain, tenderness and fever. Most patients, however, with diverticulosis do not develop diverticulitis. I would recommend a high-fiber diet for the diverticulosis. Unfortunately the preparation of the colon was poor limiting complete visualization of the colon. I would therefore recommend a follow-up colonoscopy within 1 year to exclude polyps that could have been missed due to the poor preparation. If the subsequent colonoscopy is negative for additional colon polyps, the interval can be increased to 5-7 years. If I can answer any questions regarding the above, please do not hesitate to contact me. Sincerely,Dr. Swift\"GI RNs: Please enter colonoscopy recall for 1 year.

## 2022-08-25 ENCOUNTER — MED REC SCAN ONLY (OUTPATIENT)
Dept: FAMILY MEDICINE CLINIC | Facility: CLINIC | Age: 47
End: 2022-08-25

## 2022-09-08 ENCOUNTER — PATIENT MESSAGE (OUTPATIENT)
Dept: NEUROLOGY | Facility: CLINIC | Age: 47
End: 2022-09-08

## 2022-09-08 DIAGNOSIS — G56.21 ULNAR NEUROPATHY AT ELBOW OF RIGHT UPPER EXTREMITY: Primary | ICD-10-CM

## 2022-09-08 NOTE — TELEPHONE ENCOUNTER
Called & spoke to patient who was driving at time of call. Discussed LOV note from Dr Jean Claude William. Patient requested that Dr Danyel Barger MD contact info be sent via [address] [employer] message sent.     Referral order PEND & routed to MD for review & signature

## 2022-09-12 ENCOUNTER — HOSPITAL ENCOUNTER (OUTPATIENT)
Dept: MRI IMAGING | Facility: HOSPITAL | Age: 47
Discharge: HOME | End: 2022-09-12
Attending: ORTHOPAEDIC SURGERY
Payer: MEDICAID

## 2022-09-12 DIAGNOSIS — M25.562 ACUTE PAIN OF LEFT KNEE: ICD-10-CM

## 2022-09-12 DIAGNOSIS — S83.412A SPRAIN OF MEDIAL COLLATERAL LIGAMENT OF LEFT KNEE, INITIAL ENCOUNTER: ICD-10-CM

## 2022-09-12 DIAGNOSIS — S89.92XA KNEE INJURY, LEFT, INITIAL ENCOUNTER: ICD-10-CM

## 2022-09-12 PROCEDURE — 73721 MRI JNT OF LWR EXTRE W/O DYE: CPT | Performed by: ORTHOPAEDIC SURGERY

## 2022-09-16 ENCOUNTER — OFFICE VISIT (OUTPATIENT)
Dept: PHYSICAL THERAPY | Facility: HOSPITAL | Age: 47
End: 2022-09-16
Attending: ORTHOPAEDIC SURGERY
Payer: MEDICAID

## 2022-09-16 DIAGNOSIS — S89.92XA KNEE INJURY, LEFT, INITIAL ENCOUNTER: ICD-10-CM

## 2022-09-16 DIAGNOSIS — S83.412A SPRAIN OF MEDIAL COLLATERAL LIGAMENT OF LEFT KNEE, INITIAL ENCOUNTER: ICD-10-CM

## 2022-09-16 DIAGNOSIS — M25.562 ACUTE PAIN OF LEFT KNEE: ICD-10-CM

## 2022-09-16 PROCEDURE — 97140 MANUAL THERAPY 1/> REGIONS: CPT

## 2022-09-16 PROCEDURE — 97161 PT EVAL LOW COMPLEX 20 MIN: CPT

## 2022-09-19 ENCOUNTER — OFFICE VISIT (OUTPATIENT)
Dept: SURGERY | Facility: CLINIC | Age: 47
End: 2022-09-19
Payer: MEDICAID

## 2022-09-19 VITALS — HEIGHT: 72 IN | BODY MASS INDEX: 26.01 KG/M2 | WEIGHT: 192 LBS

## 2022-09-19 DIAGNOSIS — L72.0 EPIDERMAL INCLUSION CYST: Primary | ICD-10-CM

## 2022-09-21 ENCOUNTER — OFFICE VISIT (OUTPATIENT)
Dept: PHYSICAL THERAPY | Facility: HOSPITAL | Age: 47
End: 2022-09-21
Attending: ORTHOPAEDIC SURGERY

## 2022-09-21 DIAGNOSIS — S89.92XA KNEE INJURY, LEFT, INITIAL ENCOUNTER: ICD-10-CM

## 2022-09-21 DIAGNOSIS — S83.412A SPRAIN OF MEDIAL COLLATERAL LIGAMENT OF LEFT KNEE, INITIAL ENCOUNTER: ICD-10-CM

## 2022-09-21 DIAGNOSIS — M25.562 ACUTE PAIN OF LEFT KNEE: ICD-10-CM

## 2022-09-21 PROCEDURE — 97140 MANUAL THERAPY 1/> REGIONS: CPT

## 2022-09-21 PROCEDURE — 97530 THERAPEUTIC ACTIVITIES: CPT

## 2022-09-23 ENCOUNTER — OFFICE VISIT (OUTPATIENT)
Dept: PHYSICAL THERAPY | Facility: HOSPITAL | Age: 47
End: 2022-09-23
Attending: ORTHOPAEDIC SURGERY

## 2022-09-23 DIAGNOSIS — M25.562 ACUTE PAIN OF LEFT KNEE: ICD-10-CM

## 2022-09-23 DIAGNOSIS — S89.92XA KNEE INJURY, LEFT, INITIAL ENCOUNTER: ICD-10-CM

## 2022-09-23 DIAGNOSIS — S83.412A SPRAIN OF MEDIAL COLLATERAL LIGAMENT OF LEFT KNEE, INITIAL ENCOUNTER: ICD-10-CM

## 2022-09-23 PROCEDURE — 97530 THERAPEUTIC ACTIVITIES: CPT

## 2022-09-23 PROCEDURE — 97140 MANUAL THERAPY 1/> REGIONS: CPT

## 2022-09-23 PROCEDURE — 97110 THERAPEUTIC EXERCISES: CPT

## 2022-09-26 ENCOUNTER — OFFICE VISIT (OUTPATIENT)
Dept: PHYSICAL THERAPY | Facility: HOSPITAL | Age: 47
End: 2022-09-26
Attending: ORTHOPAEDIC SURGERY

## 2022-09-26 DIAGNOSIS — M25.562 ACUTE PAIN OF LEFT KNEE: ICD-10-CM

## 2022-09-26 DIAGNOSIS — S83.412A SPRAIN OF MEDIAL COLLATERAL LIGAMENT OF LEFT KNEE, INITIAL ENCOUNTER: ICD-10-CM

## 2022-09-26 DIAGNOSIS — S89.92XA KNEE INJURY, LEFT, INITIAL ENCOUNTER: ICD-10-CM

## 2022-09-26 PROCEDURE — 97140 MANUAL THERAPY 1/> REGIONS: CPT

## 2022-09-26 PROCEDURE — 97112 NEUROMUSCULAR REEDUCATION: CPT

## 2022-09-26 PROCEDURE — 97110 THERAPEUTIC EXERCISES: CPT

## 2022-09-29 ENCOUNTER — TELEPHONE (OUTPATIENT)
Dept: NEUROLOGY | Facility: CLINIC | Age: 47
End: 2022-09-29

## 2022-09-29 ENCOUNTER — OFFICE VISIT (OUTPATIENT)
Dept: PHYSICAL THERAPY | Facility: HOSPITAL | Age: 47
End: 2022-09-29
Attending: ORTHOPAEDIC SURGERY

## 2022-09-29 DIAGNOSIS — S89.92XA KNEE INJURY, LEFT, INITIAL ENCOUNTER: ICD-10-CM

## 2022-09-29 DIAGNOSIS — S83.412A SPRAIN OF MEDIAL COLLATERAL LIGAMENT OF LEFT KNEE, INITIAL ENCOUNTER: ICD-10-CM

## 2022-09-29 DIAGNOSIS — M25.562 ACUTE PAIN OF LEFT KNEE: ICD-10-CM

## 2022-09-29 PROCEDURE — 97110 THERAPEUTIC EXERCISES: CPT

## 2022-09-29 PROCEDURE — 97140 MANUAL THERAPY 1/> REGIONS: CPT

## 2022-09-29 PROCEDURE — 97112 NEUROMUSCULAR REEDUCATION: CPT

## 2022-09-29 RX ORDER — PREDNISONE 20 MG/1
20 TABLET ORAL DAILY
Qty: 90 TABLET | Refills: 0 | Status: SHIPPED | OUTPATIENT
Start: 2022-09-29

## 2022-09-29 NOTE — TELEPHONE ENCOUNTER
I spoke with the patient and he has declined going to the ER. Stated that he is feeling better and will  the prescription. I stressed the importance of going to the ED, but he declined. The patient stated that he feels he is managing his sx's well and declined the ED. The patient has been booked for 10/4/22 with Dr. Jennifer Cooper. Message to Dr. Jennifer Cooper as Alvin Larose. Reviewed and electronically signed by:  [address], [facility]

## 2022-09-29 NOTE — TELEPHONE ENCOUNTER
Please make sure that he goes to the emergency room soon as possible. Please add him onto my schedule next Tuesday evening. In the meantime I will send in a prescription for prednisone 20 mg. However, please tell him that he has to be evaluated by a physician in the emergency room.

## 2022-09-29 NOTE — TELEPHONE ENCOUNTER
Please make sure he goes to the emergency room as soon as possible. He needs to be evaluated by a physician. Please add him onto my schedule next Tuesday evening. In the meantime I will send in a prescription for prednisone 20 mg daily.

## 2022-09-30 DIAGNOSIS — G70.00 MYASTHENIA GRAVIS (HCC): ICD-10-CM

## 2022-09-30 RX ORDER — PYRIDOSTIGMINE BROMIDE 180 MG/1
TABLET, EXTENDED RELEASE ORAL
Qty: 30 TABLET | Refills: 0 | Status: SHIPPED | OUTPATIENT
Start: 2022-09-30

## 2022-09-30 RX ORDER — AZATHIOPRINE 50 MG/1
TABLET ORAL
Qty: 120 TABLET | Refills: 0 | Status: SHIPPED | OUTPATIENT
Start: 2022-09-30

## 2022-09-30 NOTE — TELEPHONE ENCOUNTER
2 REQUESTS    LOV: 2/10/22  NOV: 10/4/22    Medication: pyridostigmine 180 MG Oral Tab CR, Take 1 tablet (180 mg total) by mouth daily. Last refill/[place]: 2/10/22 (#30/3RF)    Medication: azaTHIOprine 50 MG Oral Tab, Take 2 tablets (100 mg total) by mouth 2 (two) times daily.   Last refill/[place]PMP: 2/10/22 (#120/3RF)

## 2022-10-03 ENCOUNTER — OFFICE VISIT (OUTPATIENT)
Dept: PHYSICAL THERAPY | Facility: HOSPITAL | Age: 47
End: 2022-10-03
Attending: ORTHOPAEDIC SURGERY
Payer: MEDICAID

## 2022-10-03 DIAGNOSIS — S83.412A SPRAIN OF MEDIAL COLLATERAL LIGAMENT OF LEFT KNEE, INITIAL ENCOUNTER: ICD-10-CM

## 2022-10-03 DIAGNOSIS — S89.92XA KNEE INJURY, LEFT, INITIAL ENCOUNTER: ICD-10-CM

## 2022-10-03 DIAGNOSIS — M25.562 ACUTE PAIN OF LEFT KNEE: ICD-10-CM

## 2022-10-03 PROCEDURE — 97110 THERAPEUTIC EXERCISES: CPT

## 2022-10-03 PROCEDURE — 97140 MANUAL THERAPY 1/> REGIONS: CPT

## 2022-10-03 PROCEDURE — 97112 NEUROMUSCULAR REEDUCATION: CPT

## 2022-10-04 ENCOUNTER — OFFICE VISIT (OUTPATIENT)
Dept: NEUROLOGY | Facility: CLINIC | Age: 47
End: 2022-10-04
Payer: MEDICAID

## 2022-10-04 ENCOUNTER — LAB ENCOUNTER (OUTPATIENT)
Dept: LAB | Facility: HOSPITAL | Age: 47
End: 2022-10-04
Attending: Other
Payer: MEDICAID

## 2022-10-04 VITALS
SYSTOLIC BLOOD PRESSURE: 90 MMHG | BODY MASS INDEX: 26.41 KG/M2 | HEART RATE: 70 BPM | WEIGHT: 195 LBS | HEIGHT: 72 IN | DIASTOLIC BLOOD PRESSURE: 58 MMHG

## 2022-10-04 DIAGNOSIS — G70.00 MYASTHENIA GRAVIS (HCC): Primary | ICD-10-CM

## 2022-10-04 DIAGNOSIS — G56.21 ULNAR NEUROPATHY AT ELBOW OF RIGHT UPPER EXTREMITY: ICD-10-CM

## 2022-10-04 DIAGNOSIS — G70.00 MYASTHENIA GRAVIS (HCC): ICD-10-CM

## 2022-10-04 LAB
ANION GAP SERPL CALC-SCNC: 3 MMOL/L (ref 0–18)
BUN BLD-MCNC: 16 MG/DL (ref 7–18)
BUN/CREAT SERPL: 18 (ref 10–20)
CALCIUM BLD-MCNC: 9.5 MG/DL (ref 8.5–10.1)
CHLORIDE SERPL-SCNC: 109 MMOL/L (ref 98–112)
CO2 SERPL-SCNC: 28 MMOL/L (ref 21–32)
CREAT BLD-MCNC: 0.89 MG/DL
FASTING STATUS PATIENT QL REPORTED: NO
GFR SERPLBLD BASED ON 1.73 SQ M-ARVRAT: 106 ML/MIN/1.73M2 (ref 60–?)
GLUCOSE BLD-MCNC: 107 MG/DL (ref 70–99)
OSMOLALITY SERPL CALC.SUM OF ELEC: 292 MOSM/KG (ref 275–295)
POTASSIUM SERPL-SCNC: 4.1 MMOL/L (ref 3.5–5.1)
SODIUM SERPL-SCNC: 140 MMOL/L (ref 136–145)

## 2022-10-04 PROCEDURE — 99214 OFFICE O/P EST MOD 30 MIN: CPT | Performed by: OTHER

## 2022-10-04 PROCEDURE — 3074F SYST BP LT 130 MM HG: CPT | Performed by: OTHER

## 2022-10-04 PROCEDURE — 36415 COLL VENOUS BLD VENIPUNCTURE: CPT

## 2022-10-04 PROCEDURE — 80048 BASIC METABOLIC PNL TOTAL CA: CPT

## 2022-10-04 PROCEDURE — 3078F DIAST BP <80 MM HG: CPT | Performed by: OTHER

## 2022-10-04 PROCEDURE — 3008F BODY MASS INDEX DOCD: CPT | Performed by: OTHER

## 2022-10-04 NOTE — PROGRESS NOTES
Mr. Odalys Nickerson relates since starting prednisone 20 mg he is much better. Only towards in the end of the day he feels fatigue, occasional trouble swallowing. Does not choke. 2 weeks ago he did notice some nasal regurgitation of food. This is not happening anymore. With prolonged talking there is some slight dysarthria. He denies blurred vision double vision. No focal weakness in arms or legs. He cannot receive IVIG. At the present time review of system -12 fields    Exam: Cranial nerves normal.  Motor testing the arms legs and neck flexion with repetitive testing was normal.  No difficulty in rising from a chair without using arms repetitively. Gait is normal.    Continue prednisone 20 mg in the morning. Check electrolytes glucose today. Told him to call the office in 1 week. Told him to go to the emergency room with any shortness of breath. Told him to call the office if he develops any weakness in the arms or legs or trouble swallowing diplopia. He however states that he is much better since starting prednisone. Told him we will need to taper off prednisone in the future. Could consider increasing Imuran. Continue Mestinon. All these issues discussed at length with the patient.

## 2022-10-07 VITALS — HEIGHT: 72 IN | WEIGHT: 192 LBS | BODY MASS INDEX: 26.01 KG/M2

## 2022-10-09 ENCOUNTER — LAB ENCOUNTER (OUTPATIENT)
Dept: LAB | Facility: HOSPITAL | Age: 47
End: 2022-10-09
Attending: SURGERY
Payer: MEDICAID

## 2022-10-09 DIAGNOSIS — Z01.818 PREOP TESTING: ICD-10-CM

## 2022-10-10 ENCOUNTER — TELEPHONE (OUTPATIENT)
Dept: NEUROLOGY | Facility: CLINIC | Age: 47
End: 2022-10-10

## 2022-10-10 LAB — SARS-COV-2 RNA RESP QL NAA+PROBE: NOT DETECTED

## 2022-10-10 RX ORDER — ZOLPIDEM TARTRATE 6.25 MG/1
TABLET, FILM COATED, EXTENDED RELEASE ORAL
Qty: 30 TABLET | Refills: 0 | Status: SHIPPED | OUTPATIENT
Start: 2022-10-10

## 2022-10-11 ENCOUNTER — TELEPHONE (OUTPATIENT)
Dept: NEUROLOGY | Facility: CLINIC | Age: 47
End: 2022-10-11

## 2022-10-11 ENCOUNTER — HOSPITAL ENCOUNTER (OUTPATIENT)
Facility: HOSPITAL | Age: 47
Setting detail: HOSPITAL OUTPATIENT SURGERY
Discharge: HOME | End: 2022-10-11
Attending: SURGERY | Admitting: SURGERY
Payer: MEDICAID

## 2022-10-11 DIAGNOSIS — Z01.818 PREOP TESTING: Primary | ICD-10-CM

## 2022-10-11 DIAGNOSIS — L72.0 EPIDERMAL INCLUSION CYST: ICD-10-CM

## 2022-10-11 PROCEDURE — 12032 INTMD RPR S/A/T/EXT 2.6-7.5: CPT | Performed by: SURGERY

## 2022-10-11 PROCEDURE — 0JB70ZZ EXCISION OF BACK SUBCUTANEOUS TISSUE AND FASCIA, OPEN APPROACH: ICD-10-PCS | Performed by: SURGERY

## 2022-10-11 PROCEDURE — 11402 EXC TR-EXT B9+MARG 1.1-2 CM: CPT | Performed by: SURGERY

## 2022-10-11 RX ORDER — BUPIVACAINE HYDROCHLORIDE AND EPINEPHRINE 5; 5 MG/ML; UG/ML
INJECTION, SOLUTION PERINEURAL AS NEEDED
Status: DISCONTINUED | OUTPATIENT
Start: 2022-10-11 | End: 2022-10-11 | Stop reason: HOSPADM

## 2022-10-11 RX ORDER — ZOLPIDEM TARTRATE 5 MG/1
5 TABLET ORAL NIGHTLY PRN
Qty: 30 TABLET | Refills: 0 | Status: SHIPPED | OUTPATIENT
Start: 2022-10-11

## 2022-10-11 NOTE — OPERATIVE REPORT
[facility]    PATIENT'S NAME: Lilia Barrios   ATTENDING PHYSICIAN: Elaine Vargas MD   OPERATING PHYSICIAN: Elaine Vargas MD   PATIENT ACCOUNT#:   [de-identified]    LOCATION:  [address]  MEDICAL RECORD #:   [id number]       YOB: 1975  ADMISSION DATE:       10/11/2022      OPERATION DATE:  10/11/2022    OPERATIVE REPORT      PREOPERATIVE DIAGNOSIS:  Recurrent skin cyst of the mid, midline back. POSTOPERATIVE DIAGNOSIS:  Recurrent skin cyst of the mid, midline back. PROCEDURE:    1. Excisional biopsy, recurrent skin cyst of the mid, midline back (1.6 cm diameter)    2. Layered closure (5.4 cm). COMPLICATIONS:  None. ESTIMATED BLOOD LOSS:  5 mL. PATHOLOGY SPECIMENS:  Skin cyst.    INDICATIONS:  This 57-year-old man has a history of previous skin cyst excision in the midback, slightly to the right of the midline spinous processes several years ago. A lump has slowly been enlarging in the same area and is a source of occasional discomfort. Excision is indicated to rule out a malignancy and relieve his symptoms and avoid infection in the future. OPERATIVE TECHNIQUE:  The patient and I identified the area of interest together preoperatively. He was taken to the operating room where in prone position the back was prepped and draped in the usual aseptic fashion. Skin and subcutaneous tissue were infiltrated with 0.5% Marcaine with epinephrine. I designed a vertical incision as this was the orientation of the previous scar and would not require as long of an incision for primary closure without tension. I designed the vertical incision to include the previous scar and most of the skin anterior to the mass. Incision was made and extended sharply into deep subcutaneous tissue using electrocautery for hemostasis. There was a fair amount of scar tissue extending deep into the wound, but no sign of active infection.   The lesion was sent to Pathology and the wound irrigated. Adequate hemostasis was noted. Flaps were developed on either side and then primary closure achieved with interrupted subcutaneous 3-0 Vicryl suture and then a running 4-0 nylon suture on the skin. This was covered with glue and then eventually 2 x 2 gauze and a Tegaderm. Overall, the patient tolerated the procedure well. He was taken to the recovery area in stable condition.      Dictated By Sal Rojo MD  d: 10/11/2022 10:54:46  t: 10/11/2022 17:57:38  [facility] [id number]  BEM/

## 2022-10-11 NOTE — BRIEF OP NOTE
Pre-Operative Diagnosis: Epidermal inclusion cyst [L72.0]     Post-Operative Diagnosis: Epidermal inclusion cyst [L72.0]      Procedure Performed:   Excisional Biopsy of the mid-back skin cyst    Surgeon(s) and Role:     Jillian Higginbotham MD - Primary    Assistant(s):        Surgical Findings: same     Specimen: skin cyst     Estimated Blood Loss: No data recorded    Dictation Number:  [id number]    Catrachito Montez MD  10/11/2022  10:51 AM

## 2022-10-11 NOTE — INTERVAL H&P NOTE
Pre-op Diagnosis: Epidermal inclusion cyst [L72.0]    The above referenced H&P was reviewed by Bruce Sandhu MD on 10/11/2022, the patient was examined and no significant changes have occurred in the patient's condition since the H&P was performed. I discussed with the patient and/or legal representative the potential benefits, risks and side effects of this procedure; the likelihood of the patient achieving goals; and potential problems that might occur during recuperation. I discussed reasonable alternatives to the procedure, including risks, benefits and side effects related to the alternatives and risks related to not receiving this procedure. We will proceed with procedure as planned.

## 2022-10-14 ENCOUNTER — OFFICE VISIT (OUTPATIENT)
Dept: PHYSICAL THERAPY | Facility: HOSPITAL | Age: 47
End: 2022-10-14
Attending: ORTHOPAEDIC SURGERY
Payer: MEDICAID

## 2022-10-14 PROCEDURE — 97140 MANUAL THERAPY 1/> REGIONS: CPT

## 2022-10-14 PROCEDURE — 97110 THERAPEUTIC EXERCISES: CPT

## 2022-10-17 ENCOUNTER — APPOINTMENT (OUTPATIENT)
Dept: PHYSICAL THERAPY | Facility: HOSPITAL | Age: 47
End: 2022-10-17
Attending: ORTHOPAEDIC SURGERY
Payer: MEDICAID

## 2022-10-21 ENCOUNTER — TELEPHONE (OUTPATIENT)
Dept: SURGERY | Facility: CLINIC | Age: 47
End: 2022-10-21

## 2022-10-21 NOTE — TELEPHONE ENCOUNTER
pt wants to know when will his stitches be removed? Pt states that this Tues will be 2 weeks with the stitches in.

## 2022-10-26 ENCOUNTER — OFFICE VISIT (OUTPATIENT)
Dept: SURGERY | Facility: CLINIC | Age: 47
End: 2022-10-26
Payer: MEDICAID

## 2022-10-26 DIAGNOSIS — L72.0 EPIDERMAL INCLUSION CYST: Primary | ICD-10-CM

## 2022-10-26 PROCEDURE — 99024 POSTOP FOLLOW-UP VISIT: CPT | Performed by: SURGERY

## 2022-10-31 ENCOUNTER — APPOINTMENT (OUTPATIENT)
Dept: PHYSICAL THERAPY | Facility: HOSPITAL | Age: 47
End: 2022-10-31
Attending: ORTHOPAEDIC SURGERY
Payer: MEDICAID

## 2022-11-17 DIAGNOSIS — G70.00 MYASTHENIA GRAVIS (HCC): ICD-10-CM

## 2022-11-17 RX ORDER — PYRIDOSTIGMINE BROMIDE 180 MG/1
TABLET, EXTENDED RELEASE ORAL
Qty: 90 TABLET | Refills: 2 | Status: SHIPPED | OUTPATIENT
Start: 2022-11-17

## 2022-11-17 NOTE — TELEPHONE ENCOUNTER
The patient is requesting a refill on: PYRIDOSTIGMINE      Last OV: 10/4/22  Next OV: None scheduled  Last refilled: 9/30/22 #30 with no refills

## 2022-12-05 ENCOUNTER — TELEPHONE (OUTPATIENT)
Dept: SURGERY | Facility: CLINIC | Age: 47
End: 2022-12-05

## 2022-12-05 RX ORDER — PREDNISONE 20 MG/1
TABLET ORAL
Qty: 90 TABLET | Refills: 3 | Status: SHIPPED | OUTPATIENT
Start: 2022-12-05

## 2022-12-05 NOTE — TELEPHONE ENCOUNTER
Spoke with pt. Reminded him we needed his EMG results faxed to us for appointment 12/12/22 with Dr Tanner Tanner. Verbalized understanding.   States will drop them off today

## 2022-12-12 ENCOUNTER — OFFICE VISIT (OUTPATIENT)
Dept: SURGERY | Facility: CLINIC | Age: 47
End: 2022-12-12
Payer: MEDICAID

## 2022-12-12 DIAGNOSIS — G56.21 ULNAR NERVE ENTRAPMENT, RIGHT: Primary | ICD-10-CM

## 2022-12-12 PROCEDURE — 99243 OFF/OP CNSLTJ NEW/EST LOW 30: CPT | Performed by: PLASTIC SURGERY

## 2022-12-12 RX ORDER — HYDROCODONE BITARTRATE AND ACETAMINOPHEN 7.5; 325 MG/1; MG/1
TABLET ORAL
Qty: 25 TABLET | Refills: 0 | Status: SHIPPED | OUTPATIENT
Start: 2022-12-12

## 2022-12-31 ENCOUNTER — LAB ENCOUNTER (OUTPATIENT)
Dept: LAB | Facility: HOSPITAL | Age: 47
End: 2022-12-31
Attending: PLASTIC SURGERY
Payer: MEDICAID

## 2022-12-31 DIAGNOSIS — Z01.818 PREOPERATIVE TESTING: ICD-10-CM

## 2022-12-31 LAB — SARS-COV-2 RNA RESP QL NAA+PROBE: NOT DETECTED

## 2023-01-03 ENCOUNTER — HOSPITAL DOCUMENTATION (OUTPATIENT)
Dept: SURGERY | Facility: CLINIC | Age: 48
End: 2023-01-03
Payer: MEDICAID

## 2023-01-03 ENCOUNTER — ANESTHESIA EVENT (OUTPATIENT)
Dept: SURGERY | Facility: HOSPITAL | Age: 48
End: 2023-01-03
Payer: MEDICAID

## 2023-01-03 ENCOUNTER — ANESTHESIA (OUTPATIENT)
Dept: SURGERY | Facility: HOSPITAL | Age: 48
End: 2023-01-03
Payer: MEDICAID

## 2023-01-03 ENCOUNTER — HOSPITAL ENCOUNTER (OUTPATIENT)
Facility: HOSPITAL | Age: 48
Setting detail: HOSPITAL OUTPATIENT SURGERY
Discharge: HOME | End: 2023-01-03
Attending: PLASTIC SURGERY | Admitting: PLASTIC SURGERY
Payer: MEDICAID

## 2023-01-03 VITALS
DIASTOLIC BLOOD PRESSURE: 68 MMHG | SYSTOLIC BLOOD PRESSURE: 107 MMHG | TEMPERATURE: 98 F | HEIGHT: 72 IN | BODY MASS INDEX: 26.28 KG/M2 | WEIGHT: 194 LBS | HEART RATE: 68 BPM | OXYGEN SATURATION: 95 % | RESPIRATION RATE: 12 BRPM

## 2023-01-03 DIAGNOSIS — Z01.818 PREOPERATIVE TESTING: Primary | ICD-10-CM

## 2023-01-03 DIAGNOSIS — G56.21 ULNAR NERVE ENTRAPMENT, RIGHT: Primary | ICD-10-CM

## 2023-01-03 PROCEDURE — 64718 REVISE ULNAR NERVE AT ELBOW: CPT | Performed by: PLASTIC SURGERY

## 2023-01-03 PROCEDURE — 01N40ZZ RELEASE ULNAR NERVE, OPEN APPROACH: ICD-10-PCS | Performed by: PLASTIC SURGERY

## 2023-01-03 RX ORDER — ACETAMINOPHEN 500 MG
1000 TABLET ORAL ONCE
Status: COMPLETED | OUTPATIENT
Start: 2023-01-03 | End: 2023-01-03

## 2023-01-03 RX ORDER — PROCHLORPERAZINE EDISYLATE 5 MG/ML
5 INJECTION INTRAMUSCULAR; INTRAVENOUS EVERY 8 HOURS PRN
Status: DISCONTINUED | OUTPATIENT
Start: 2023-01-03 | End: 2023-01-03

## 2023-01-03 RX ORDER — MIDAZOLAM HYDROCHLORIDE 1 MG/ML
INJECTION INTRAMUSCULAR; INTRAVENOUS AS NEEDED
Status: DISCONTINUED | OUTPATIENT
Start: 2023-01-03 | End: 2023-01-03 | Stop reason: SURG

## 2023-01-03 RX ORDER — ONDANSETRON 2 MG/ML
INJECTION INTRAMUSCULAR; INTRAVENOUS AS NEEDED
Status: DISCONTINUED | OUTPATIENT
Start: 2023-01-03 | End: 2023-01-03 | Stop reason: SURG

## 2023-01-03 RX ORDER — LIDOCAINE HYDROCHLORIDE 10 MG/ML
INJECTION, SOLUTION EPIDURAL; INFILTRATION; INTRACAUDAL; PERINEURAL AS NEEDED
Status: DISCONTINUED | OUTPATIENT
Start: 2023-01-03 | End: 2023-01-03 | Stop reason: SURG

## 2023-01-03 RX ORDER — HYDROMORPHONE HYDROCHLORIDE 1 MG/ML
0.2 INJECTION, SOLUTION INTRAMUSCULAR; INTRAVENOUS; SUBCUTANEOUS EVERY 5 MIN PRN
Status: DISCONTINUED | OUTPATIENT
Start: 2023-01-03 | End: 2023-01-03

## 2023-01-03 RX ORDER — SODIUM CHLORIDE, SODIUM LACTATE, POTASSIUM CHLORIDE, CALCIUM CHLORIDE 600; 310; 30; 20 MG/100ML; MG/100ML; MG/100ML; MG/100ML
INJECTION, SOLUTION INTRAVENOUS CONTINUOUS
Status: DISCONTINUED | OUTPATIENT
Start: 2023-01-03 | End: 2023-01-03

## 2023-01-03 RX ORDER — NALOXONE HYDROCHLORIDE 0.4 MG/ML
80 INJECTION, SOLUTION INTRAMUSCULAR; INTRAVENOUS; SUBCUTANEOUS AS NEEDED
Status: DISCONTINUED | OUTPATIENT
Start: 2023-01-03 | End: 2023-01-03

## 2023-01-03 RX ORDER — HYDROMORPHONE HYDROCHLORIDE 1 MG/ML
0.4 INJECTION, SOLUTION INTRAMUSCULAR; INTRAVENOUS; SUBCUTANEOUS EVERY 5 MIN PRN
Status: DISCONTINUED | OUTPATIENT
Start: 2023-01-03 | End: 2023-01-03

## 2023-01-03 RX ORDER — HYDROCODONE BITARTRATE AND ACETAMINOPHEN 7.5; 325 MG/1; MG/1
1 TABLET ORAL EVERY 4 HOURS PRN
Status: DISCONTINUED | OUTPATIENT
Start: 2023-01-03 | End: 2023-01-03

## 2023-01-03 RX ORDER — ONDANSETRON 2 MG/ML
4 INJECTION INTRAMUSCULAR; INTRAVENOUS EVERY 6 HOURS PRN
Status: DISCONTINUED | OUTPATIENT
Start: 2023-01-03 | End: 2023-01-03

## 2023-01-03 RX ORDER — MORPHINE SULFATE 10 MG/ML
6 INJECTION, SOLUTION INTRAMUSCULAR; INTRAVENOUS EVERY 10 MIN PRN
Status: DISCONTINUED | OUTPATIENT
Start: 2023-01-03 | End: 2023-01-03

## 2023-01-03 RX ORDER — MORPHINE SULFATE 4 MG/ML
4 INJECTION, SOLUTION INTRAMUSCULAR; INTRAVENOUS EVERY 10 MIN PRN
Status: DISCONTINUED | OUTPATIENT
Start: 2023-01-03 | End: 2023-01-03

## 2023-01-03 RX ORDER — KETOROLAC TROMETHAMINE 30 MG/ML
INJECTION, SOLUTION INTRAMUSCULAR; INTRAVENOUS AS NEEDED
Status: DISCONTINUED | OUTPATIENT
Start: 2023-01-03 | End: 2023-01-03 | Stop reason: SURG

## 2023-01-03 RX ORDER — HYDROMORPHONE HYDROCHLORIDE 1 MG/ML
0.6 INJECTION, SOLUTION INTRAMUSCULAR; INTRAVENOUS; SUBCUTANEOUS EVERY 5 MIN PRN
Status: DISCONTINUED | OUTPATIENT
Start: 2023-01-03 | End: 2023-01-03

## 2023-01-03 RX ORDER — MORPHINE SULFATE 4 MG/ML
2 INJECTION, SOLUTION INTRAMUSCULAR; INTRAVENOUS EVERY 10 MIN PRN
Status: DISCONTINUED | OUTPATIENT
Start: 2023-01-03 | End: 2023-01-03

## 2023-01-03 RX ADMIN — SODIUM CHLORIDE, SODIUM LACTATE, POTASSIUM CHLORIDE, CALCIUM CHLORIDE: 600; 310; 30; 20 INJECTION, SOLUTION INTRAVENOUS at 14:15:00

## 2023-01-03 RX ADMIN — MIDAZOLAM HYDROCHLORIDE 1 MG: 1 INJECTION INTRAMUSCULAR; INTRAVENOUS at 13:57:00

## 2023-01-03 RX ADMIN — SODIUM CHLORIDE, SODIUM LACTATE, POTASSIUM CHLORIDE, CALCIUM CHLORIDE: 600; 310; 30; 20 INJECTION, SOLUTION INTRAVENOUS at 15:47:00

## 2023-01-03 RX ADMIN — SODIUM CHLORIDE, SODIUM LACTATE, POTASSIUM CHLORIDE, CALCIUM CHLORIDE: 600; 310; 30; 20 INJECTION, SOLUTION INTRAVENOUS at 13:53:00

## 2023-01-03 RX ADMIN — MIDAZOLAM HYDROCHLORIDE 1 MG: 1 INJECTION INTRAMUSCULAR; INTRAVENOUS at 14:05:00

## 2023-01-03 RX ADMIN — KETOROLAC TROMETHAMINE 30 MG: 30 INJECTION, SOLUTION INTRAMUSCULAR; INTRAVENOUS at 15:20:00

## 2023-01-03 RX ADMIN — ONDANSETRON 4 MG: 2 INJECTION INTRAMUSCULAR; INTRAVENOUS at 15:19:00

## 2023-01-03 RX ADMIN — LIDOCAINE HYDROCHLORIDE 50 MG: 10 INJECTION, SOLUTION EPIDURAL; INFILTRATION; INTRACAUDAL; PERINEURAL at 14:03:00

## 2023-01-03 RX ADMIN — SODIUM CHLORIDE, SODIUM LACTATE, POTASSIUM CHLORIDE, CALCIUM CHLORIDE: 600; 310; 30; 20 INJECTION, SOLUTION INTRAVENOUS at 13:35:00

## 2023-01-03 NOTE — INTERVAL H&P NOTE
Pre-op Diagnosis: Ulnar nerve entrapment, right [G56.21]    The above referenced H&P was reviewed by Marlee Quezada MD on 1/3/2023, the patient was examined and no significant changes have occurred in the patient's condition since the H&P was performed. I discussed with the patient and/or legal representative the potential benefits, risks and side effects of this procedure; the likelihood of the patient achieving goals; and potential problems that might occur during recuperation. I discussed reasonable alternatives to the procedure, including risks, benefits and side effects related to the alternatives and risks related to not receiving this procedure. We will proceed with procedure as planned.

## 2023-01-03 NOTE — BRIEF OP NOTE
Patient sleeping soundly at 0300.   Pre-Operative Diagnosis: Ulnar nerve entrapment, right [G56.21]     Post-Operative Diagnosis: Ulnar nerve entrapment, right [G56.21]      Procedure Performed:   Right ulnar nerve decompression transposition    Surgeon(s) and Role:     * Yane Henry MD - Primary    Assistant(s):        Surgical Findings: Ulnar nerve compression     Specimen: None     Estimated Blood Loss: 2 ml    Dictation Number:      Daisy Parra MD  1/3/2023  3:46 PM

## 2023-01-03 NOTE — OPERATIVE REPORT
[facility]    PATIENT'S NAME: Emigdio Etienne   ATTENDING PHYSICIAN: Hanna Pink MD   OPERATING PHYSICIAN: Hanna Pink MD   PATIENT ACCOUNT#:   [de-identified]    LOCATION:  [place] [address]  MEDICAL RECORD #:   [id number]       YOB: 1975  ADMISSION DATE:       01/03/2023      OPERATION DATE:  01/03/2023    OPERATIVE REPORT      PREOPERATIVE DIAGNOSIS:  Severe right ulnar nerve compression at elbow. POSTOPERATIVE DIAGNOSIS:  Severe right ulnar nerve compression at elbow. PROCEDURE:  Right ulnar nerve decompression and subcutaneous transposition, right elbow. INDICATIONS:  A 44-year-old male, right-hand dominant, with a 6-year history of worsening right ulnar nerve symptoms. He has constant numbness in the ring and small finger. The hand is weak. He is admitted to the operating amphitheater for ulnar nerve decompression and transposition. Because of the severity of the ulnar nerve compression and the longstanding nature of it, as well as the fact that he has intrinsic atrophy, he is aware of the fact that his symptoms may not be relieved at all, that the numbness may persist, and that the weakness may persist.  The hope is to prevent further progression and to allow therapy to strengthen his weakened intrinsics. FINDINGS:  The right hand has full range of motion but with intrinsic atrophy and intrinsic weakness. The 2-point discrimination is 7 to 9 in the ring and small fingers, 5 in the other digits. At surgery is found a very dense intermuscular septum in the arm. The venae comitantes are extremely prominent proximally to the level of the cubital tunnel. Rodríguez bands are dense, and the nerve is somewhat swollen deep to [employer] bands. The nerve is compressed between the 2 heads of the flexor carpi ulnaris muscle. At the completion of the decompression, the nerve was white, shiny, and healthy-looking grossly.     OPERATIVE TECHNIQUE:  Patient was placed under general anesthesia. The upper extremity was prepped and draped in the usual sterile fashion. A pneumatic tourniquet was inflated to 150 mmHg. A longitudinal incision centered over the ulnar nerve was made. We identified the medial antebrachial cutaneous nerve which coursed distal to the medial epicondyle within the surgical field. We carefully preserved it throughout the dissection. The ulnar nerve was identified in the arm, dissected proximally, and carefully dissected from its compressing venae comitantes. This took us to the cubital tunnel, and Rodríguez bands were sharply divided to decompress the nerve. The medial intermuscular septum was excised. As we decompressed the nerve within the cubital tunnel, we identified the branch to the flexor carpi ulnaris as well as to the flexors digitorum profundae and carefully preserved these 2 branches. A generous subcutaneous tunnel had been created. The nerve was transposed and lay in a natural position without compression or kinking. We excised fascia over the combined origin of the flexor muscles so that the nerve lay in a soft bed of muscle. We took the elbow through full range of motion. There was no further compression or kinking, and the nerve lay in a natural position. There was no tension on the branches to the flexor carpi ulnaris or flexor digitorum profundus. At the completion of the procedure, the medial antebrachial cutaneous nerve lay over the ulnar nerve and parallel to the branch to the flexor carpi ulnaris. The flap was sutured to the medial epicondyle with figure-of-eight 3-0 Vicryls, and 2 gloved fingers could be passed through the tunnel without constriction. Deep dermal sutures of 3-0 Vicryl were placed. Skin edges were reapproximated with 4-0 nylon. A soft dressing was placed incorporating a posterior mold splint. The tourniquet was released. Total tourniquet time 69 minutes.      The patient tolerated the procedure well and left the operating suite in satisfactory condition.      Dictated By Tonya Jhaveri MD  d: 01/03/2023 15:52:50  t: 01/03/2023 17:12:47  [facility] [id number]  JBO/    cc: MD Ari Wray MD Brett Sager, MD

## 2023-01-04 ENCOUNTER — TELEPHONE (OUTPATIENT)
Dept: SURGERY | Facility: CLINIC | Age: 48
End: 2023-01-04

## 2023-01-04 NOTE — TELEPHONE ENCOUNTER
Spoke with pt. \"I'm managing my pain alternating taking ibuprofen and a half of Norco\"  Numbness of RSF and RRF remain but have lessened. Dressing and splint CDI  Wearing sling at all times. Reminded of next appointment 1/5/22 with OT  Instructed to call the office with any questions and/or concerns. Verbalized understanding. Dr Lisa Ma notified.

## 2023-01-05 ENCOUNTER — OFFICE VISIT (OUTPATIENT)
Dept: SURGERY | Facility: CLINIC | Age: 48
End: 2023-01-05
Payer: MEDICAID

## 2023-01-05 DIAGNOSIS — M62.81 DISTAL MUSCLE WEAKNESS: Primary | ICD-10-CM

## 2023-01-05 DIAGNOSIS — M25.641 JOINT STIFFNESS OF HAND, RIGHT: ICD-10-CM

## 2023-01-05 PROCEDURE — 29105 APPLICATION LONG ARM SPLINT: CPT | Performed by: OCCUPATIONAL THERAPIST

## 2023-01-17 ENCOUNTER — NURSE ONLY (OUTPATIENT)
Dept: SURGERY | Facility: CLINIC | Age: 48
End: 2023-01-17

## 2023-01-17 ENCOUNTER — OFFICE VISIT (OUTPATIENT)
Dept: SURGERY | Facility: CLINIC | Age: 48
End: 2023-01-17

## 2023-01-17 DIAGNOSIS — G56.21 ULNAR NERVE ENTRAPMENT, RIGHT: ICD-10-CM

## 2023-01-17 DIAGNOSIS — M25.641 JOINT STIFFNESS OF HAND, RIGHT: ICD-10-CM

## 2023-01-17 DIAGNOSIS — M62.81 DISTAL MUSCLE WEAKNESS: Primary | ICD-10-CM

## 2023-01-17 DIAGNOSIS — Z48.02 ENCOUNTER FOR REMOVAL OF SUTURES: Primary | ICD-10-CM

## 2023-01-17 PROCEDURE — 97110 THERAPEUTIC EXERCISES: CPT | Performed by: OCCUPATIONAL THERAPIST

## 2023-01-17 PROCEDURE — 97166 OT EVAL MOD COMPLEX 45 MIN: CPT | Performed by: OCCUPATIONAL THERAPIST

## 2023-01-17 NOTE — PROGRESS NOTES
Surgery 1: R ulnar nerve decompression / transposition  - Date: 01/03/23  - Days Since: 14    Pt here for suture removal to right ulnar arm  Pt identified w/2 identifiers and orders verified. Pt presents w/sutures C/D/I. Pt denies s/s infection. Pt complains of intermittent discomfort 1/10 taking ibuprofen over the counter that is helpful  Running stitch C/D/I. Incision appears to be healing well, edges well approximated. Running stitch removed without difficulty and pt tolerated procedure well. Site cleansed w/soap and water and escorted to scheduled OT appointment  Pt reminded of f/u appt w/MD on 1/26/23  Pt instructed to call the office w/any further questions and/or concerns. Dr. Marilou Bey notified.     SR right ulnar arm

## 2023-01-17 NOTE — PROGRESS NOTES
OCCUPATIONAL THERAPY EVALUATION:   Jose Lu   [id number]       SUBJECTIVE:    HX of Injury: Right Ulnar Nerve Transposition. Chief Complaint:   No complaints. Precautions: NO work involving the right upper extremity. Premorbid Functional Status: Independent w/ Occ. duties, Independent w/ driving / sitting, Independent w/ ADL's  Current Level of Function: Independent with self care task needs. Employment: Working restricted duty  Hand Dominance: right  Living Situation: Not addressed  Barriers to Learning: None  Patient Goals: Full use of the right upper extremity    Imaging/Tests: EMG        OBJECTIVE DATA:   PAIN:   Rating (1/10): 1/10 at rest, 2/10 with activity  Location:       SCAR/INCISION: Flat, Non-adhered and Flexible    SENSORY:  Diminished right small finger distally. AROM/PROM:  (Degrees)  RIGHT HAND:    Thumb IF MF RF SF   MP Right Elbow flexion / extension:    -20 / 120 degrees       PIP        DIP        MIN                  STRENGTH: (lbs) Right Average Left Average   : NT NT   2 pt Pinch:     3 pt Pinch:     Lateral Pinch:         ASSESSMENT & PLAN OF CARE:    Treatment Provided: Patient was seen for an initial evaluation, hand washing: Following suture removal by nursing, OT reviewed cold cream scar massage technique. HEP:  AROM, Tendon glides, x 20 reps per set, x 5 sets daily. Reviewed hand elevation importance. Written handout was provided to reinforce today's treatment and educational session. Rehabilitation Potential: Excellent    CLINICAL ASSESSMENT:    Patient/Caregiver Education Provided: Yes    Treatment Plan:  Therapeutic Exercise  Therapeutic Activities  Scar Management  Patient/Family Education    GOALS:  Short term goals to be reached in x 1 week:    1) Independent with HEP. .    . Long term goals to be reached in x 2 - 3 weeks:    1) Full functional use of the involved extremity for self-care, leisure and work related tasks:  .         Patient will be seen 1 x /week for 3 weeks or a total of 3 visits. Pt. was advised regarding the findings of this evaluation and agrees to the plan of care. Abiodun De La Cruz I have reviewed the treatment plan and concur.    Marcos Winslow MD

## 2023-01-26 ENCOUNTER — OFFICE VISIT (OUTPATIENT)
Dept: SURGERY | Facility: CLINIC | Age: 48
End: 2023-01-26

## 2023-01-26 DIAGNOSIS — M62.81 DISTAL MUSCLE WEAKNESS: Primary | ICD-10-CM

## 2023-01-26 DIAGNOSIS — G56.21 ULNAR NERVE ENTRAPMENT, RIGHT: Primary | ICD-10-CM

## 2023-01-26 DIAGNOSIS — M25.641 JOINT STIFFNESS OF HAND, RIGHT: ICD-10-CM

## 2023-01-26 PROCEDURE — 97110 THERAPEUTIC EXERCISES: CPT | Performed by: OCCUPATIONAL THERAPIST

## 2023-01-26 PROCEDURE — 99024 POSTOP FOLLOW-UP VISIT: CPT | Performed by: PLASTIC SURGERY

## 2023-01-26 NOTE — PROGRESS NOTES
Surgery 1: R ulnar nerve decompression / transposition  - Date: 01/03/23  - Days Since: 23  Intermittent dull scar discomfort. Rates pain 1/10. Not taking analgesics. Numbness remains RSF and RRF but has lessened. Scar healing well without s/s of infection. Doing Eucerin massage every day,encouraged TID    23 days postop    Less numbness RRF/RSF    He is very pleased with his result. The hand was \"clumsy\" and he used to have to \"fumble\" by performing certain maneuvers. He no longer has these issues, and he has normal function      No pain    Full range of motion  Scar looks excellent    Strength 100 versus 130    Pinch:  Lateral 28 versus 32  Two-point pinch 14 versus 16  Three-point pinch 18 versus 22    He is doing extremely well    Discharged. To call if any problems or concerns.

## 2023-01-26 NOTE — PROGRESS NOTES
Subjective: I have been working some      Objective:     Current level of performance:  ADL: Independent  Work: Returning to unrestricted work activity. Leisure: Not addressed    Measurements/Tests:  ROM:  Testing By: [email or web address]   Strength Right: 100 #      Strength Left: 130 #      Treatment Provided this day: Updated bilateral hand strength measurements: Physician follow-up. Treatment Time: 20 minutes      Summary/Analysis of Treatment session: No further OT needs at this time. Plan: Discontinue OT. Follow up in: To call with questions and or concerns          Martha House  OTR/L      I have reviewed the treatment plan and concur.    Ken Kiran MD

## 2023-02-09 ENCOUNTER — TELEPHONE (OUTPATIENT)
Dept: SURGERY | Facility: CLINIC | Age: 48
End: 2023-02-09

## 2023-02-09 NOTE — TELEPHONE ENCOUNTER
Received fax from [address] [place] [address] with approval, of CPT code: 10192, Duplex scan, and CPT 14668, Ultrasound of the scrotum. Will send copy to scanning.

## 2023-03-15 ENCOUNTER — TELEMEDICINE (OUTPATIENT)
Dept: FAMILY MEDICINE CLINIC | Facility: CLINIC | Age: 48
End: 2023-03-15

## 2023-03-15 VITALS
HEIGHT: 72 IN | WEIGHT: 203 LBS | SYSTOLIC BLOOD PRESSURE: 118 MMHG | HEART RATE: 59 BPM | RESPIRATION RATE: 18 BRPM | OXYGEN SATURATION: 100 % | DIASTOLIC BLOOD PRESSURE: 76 MMHG | BODY MASS INDEX: 27.5 KG/M2

## 2023-03-15 DIAGNOSIS — H69.80 DYSFUNCTION OF EUSTACHIAN TUBE, UNSPECIFIED LATERALITY: Primary | ICD-10-CM

## 2023-03-15 PROCEDURE — 3078F DIAST BP <80 MM HG: CPT | Performed by: FAMILY MEDICINE

## 2023-03-15 PROCEDURE — 3008F BODY MASS INDEX DOCD: CPT | Performed by: FAMILY MEDICINE

## 2023-03-15 PROCEDURE — 99213 OFFICE O/P EST LOW 20 MIN: CPT | Performed by: FAMILY MEDICINE

## 2023-03-15 PROCEDURE — 3074F SYST BP LT 130 MM HG: CPT | Performed by: FAMILY MEDICINE

## 2023-04-19 ENCOUNTER — TELEPHONE (OUTPATIENT)
Dept: NEUROLOGY | Facility: CLINIC | Age: 48
End: 2023-04-19

## 2023-04-19 NOTE — TELEPHONE ENCOUNTER
Order Details    Medication NDC Prescription # CODIE   Azathioprine 50 Mg Tab Amne [id number] No   Written Date Last Fill Date Quantity Days Supply   11/29/2022 11/29/2022 120 tablet 30   Sig Notes   TAKE TWO TABLETS BY MOUTH TWICE DAILY Not Available       Refill request received from   Pharmacy Information    Pharmacy NCPDP ID Address Phone   [id number] [place] [address] [phone] [facility]      Former [facility].

## 2023-05-11 ENCOUNTER — TELEPHONE (OUTPATIENT)
Facility: CLINIC | Age: 48
End: 2023-05-11

## 2023-05-11 NOTE — TELEPHONE ENCOUNTER
Patient outreach message received:    Health maintenance updated and message sent to patient outreach to complete colonoscopy in 1 year. Due: 8/9/23    Recall reminder letter mailed out to patient.

## 2023-09-24 NOTE — PROGRESS NOTES
Subjective: This feels much better. 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 Plan: To be compliant with the wear and care of right elbow posterior mold x 12 days.       Follow up in:  01/17/2023          Sherrie STEPHENS/ANA
Not applicable

## 2023-10-03 ENCOUNTER — OFFICE VISIT (OUTPATIENT)
Dept: FAMILY MEDICINE CLINIC | Facility: CLINIC | Age: 48
End: 2023-10-03

## 2023-10-03 VITALS
HEART RATE: 62 BPM | WEIGHT: 204 LBS | OXYGEN SATURATION: 100 % | SYSTOLIC BLOOD PRESSURE: 98 MMHG | HEIGHT: 72 IN | DIASTOLIC BLOOD PRESSURE: 61 MMHG | BODY MASS INDEX: 27.63 KG/M2 | RESPIRATION RATE: 18 BRPM

## 2023-10-03 DIAGNOSIS — M25.511 RIGHT SHOULDER PAIN, UNSPECIFIED CHRONICITY: ICD-10-CM

## 2023-10-03 DIAGNOSIS — H66.90 ACUTE OTITIS MEDIA, UNSPECIFIED OTITIS MEDIA TYPE: ICD-10-CM

## 2023-10-03 DIAGNOSIS — H00.012 HORDEOLUM EXTERNUM OF RIGHT LOWER EYELID: Primary | ICD-10-CM

## 2023-10-03 PROCEDURE — 99214 OFFICE O/P EST MOD 30 MIN: CPT | Performed by: FAMILY MEDICINE

## 2023-10-03 PROCEDURE — 3078F DIAST BP <80 MM HG: CPT | Performed by: FAMILY MEDICINE

## 2023-10-03 PROCEDURE — 3074F SYST BP LT 130 MM HG: CPT | Performed by: FAMILY MEDICINE

## 2023-10-03 PROCEDURE — 3008F BODY MASS INDEX DOCD: CPT | Performed by: FAMILY MEDICINE

## 2023-10-03 RX ORDER — ERYTHROMYCIN 5 MG/G
1 OINTMENT OPHTHALMIC NIGHTLY
Qty: 1 EACH | Refills: 0 | Status: SHIPPED | OUTPATIENT
Start: 2023-10-03

## 2023-10-03 RX ORDER — AMOXICILLIN 875 MG/1
875 TABLET, COATED ORAL 2 TIMES DAILY
Qty: 20 TABLET | Refills: 0 | Status: SHIPPED | OUTPATIENT
Start: 2023-10-03 | End: 2023-10-13

## 2023-10-03 NOTE — PROGRESS NOTES
Subjective:   Patient ID: Jan Vega is a 49year old male. HPI  Patient sty right eye   Has right ear pain for almost a week   No fever not getting better   Has left shoulder pain on and off worse with lifting shoulder   Now doing well   History/Other:   Review of Systems    Constitutional: Negative. Negative for activity change, appetite change, diaphoresis and fatigue. Heent see hpi   Respiratory: Negative. Negative for apnea, cough, chest tightness and shortness of breath. Cardiovascular: Negative. Negative for chest pain, palpitations and leg swelling. MSK see hpi   Current Outpatient Medications   Medication Sig Dispense Refill    amoxicillin 875 MG Oral Tab Take 1 tablet (875 mg total) by mouth 2 (two) times daily for 10 days. 20 tablet 0    erythromycin 5 MG/GM Ophthalmic Ointment Apply 1 Application to eye nightly. 1 each 0    AZATHIOPRINE 50 MG Oral Tab TAKE TWO TABLETS BY MOUTH TWICE DAILY 120 tablet 0    PYRIDOSTIGMINE  MG Oral Tab CR TAKE ONE TABLET BY MOUTH ONE TIME DAILY. (Follow-up appointment needed) (Patient taking differently: Take 1 tablet (180 mg total) by mouth at bedtime.) 90 tablet 2    zolpidem 5 MG Oral Tab Take 1 tablet (5 mg total) by mouth nightly as needed for Sleep. 30 tablet 0    pyridostigmine 60 MG Oral Tab Two po am, three  at lunch and three at dinner 240 tablet 12    Loperamide HCl 2 MG Oral Cap Take 1 capsule (2 mg total) by mouth 4 (four) times daily as needed for Diarrhea. Multiple Vitamins-Minerals (MULTIVITAMIN OR) Take  by mouth. PREDNISONE 20 MG Oral Tab TAKE ONE TABLET BY MOUTH ONE TIME DAILY (Patient not taking: Reported on 10/3/2023) 90 tablet 3    Ibuprofen 200 MG Oral Tab Take 400 mg by mouth every 6 (six) hours as needed. (Patient not taking: Reported on 1/26/2023)       Allergies:No Known Allergies    Objective:   Physical Exam  Constitutional:       Appearance: He is well-developed.    HENT:      Ears:      Comments: Right tm red and retracted     Eyes:      Comments: Hordeolum externum lower left eye lid    Cardiovascular:      Rate and Rhythm: Normal rate and regular rhythm. Heart sounds: Normal heart sounds. Pulmonary:      Effort: Pulmonary effort is normal.      Breath sounds: Normal breath sounds. Musculoskeletal:         General: No swelling, tenderness, deformity or signs of injury. Normal range of motion. Right lower leg: No edema. Left lower leg: No edema. Neurological:      Mental Status: He is alert. Deep Tendon Reflexes: Reflexes are normal and symmetric. Assessment & Plan:   Hordeolum externum of right lower eyelid  (primary encounter diagnosis)  [place] ophth ointm warm compresses if no better see ophth   Otitis media- start amoxil '  Shoulder pain appears as tendinitis - start exercise and nsaids  No orders of the defined types were placed in this encounter. Meds This Visit:  Requested Prescriptions     Signed Prescriptions Disp Refills    amoxicillin 875 MG Oral Tab 20 tablet 0     Sig: Take 1 tablet (875 mg total) by mouth 2 (two) times daily for 10 days. erythromycin 5 MG/GM Ophthalmic Ointment 1 each 0     Sig: Apply 1 Application to eye nightly.        Imaging & Referrals:  OPHTHALMOLOGY - INTERNAL

## 2023-10-05 ENCOUNTER — TELEPHONE (OUTPATIENT)
Dept: FAMILY MEDICINE CLINIC | Facility: CLINIC | Age: 48
End: 2023-10-05

## 2023-10-05 RX ORDER — SULFACETAMIDE SODIUM 100 MG/ML
1 SOLUTION/ DROPS OPHTHALMIC
Qty: 1 EACH | Refills: 0 | Status: SHIPPED | OUTPATIENT
Start: 2023-10-05 | End: 2023-10-22

## 2023-10-05 NOTE — TELEPHONE ENCOUNTER
Pharmacy: NDC is discontinued. Can you send alternative ?      erythromycin 5 MG/GM Ophthalmic Ointment, Apply 1 Application to eye nightly., Disp: 1 each, Rfl: 0

## 2023-10-19 ENCOUNTER — TELEPHONE (OUTPATIENT)
Dept: FAMILY MEDICINE CLINIC | Facility: CLINIC | Age: 48
End: 2023-10-19

## 2023-10-22 RX ORDER — SULFACETAMIDE SODIUM 100 MG/ML
1 SOLUTION/ DROPS OPHTHALMIC
Qty: 1 EACH | Refills: 0 | Status: SHIPPED | OUTPATIENT
Start: 2023-10-22

## 2023-12-18 ENCOUNTER — TELEPHONE (OUTPATIENT)
Dept: FAMILY MEDICINE CLINIC | Facility: CLINIC | Age: 48
End: 2023-12-18

## 2023-12-18 NOTE — TELEPHONE ENCOUNTER
Patient scheduled an appt via [facility] for 01/05/2024 for the following symptoms:    Left shoulder problem has gotten worse.  Very painful and limiting using my arm

## 2024-01-02 ENCOUNTER — OFFICE VISIT (OUTPATIENT)
Dept: FAMILY MEDICINE CLINIC | Facility: CLINIC | Age: 49
End: 2024-01-02

## 2024-01-02 ENCOUNTER — HOSPITAL ENCOUNTER (OUTPATIENT)
Dept: GENERAL RADIOLOGY | Facility: HOSPITAL | Age: 49
Discharge: HOME | End: 2024-01-02
Attending: FAMILY MEDICINE
Payer: MEDICAID

## 2024-01-02 VITALS
HEART RATE: 63 BPM | SYSTOLIC BLOOD PRESSURE: 102 MMHG | BODY MASS INDEX: 28.15 KG/M2 | WEIGHT: 207.81 LBS | DIASTOLIC BLOOD PRESSURE: 68 MMHG | HEIGHT: 72 IN

## 2024-01-02 DIAGNOSIS — M25.512 LEFT SHOULDER PAIN, UNSPECIFIED CHRONICITY: ICD-10-CM

## 2024-01-02 DIAGNOSIS — M25.512 LEFT SHOULDER PAIN, UNSPECIFIED CHRONICITY: Primary | ICD-10-CM

## 2024-01-02 PROCEDURE — 3074F SYST BP LT 130 MM HG: CPT | Performed by: FAMILY MEDICINE

## 2024-01-02 PROCEDURE — 73030 X-RAY EXAM OF SHOULDER: CPT | Performed by: FAMILY MEDICINE

## 2024-01-02 PROCEDURE — 3008F BODY MASS INDEX DOCD: CPT | Performed by: FAMILY MEDICINE

## 2024-01-02 PROCEDURE — 99214 OFFICE O/P EST MOD 30 MIN: CPT | Performed by: FAMILY MEDICINE

## 2024-01-02 PROCEDURE — 3078F DIAST BP <80 MM HG: CPT | Performed by: FAMILY MEDICINE

## 2024-01-02 RX ORDER — MELOXICAM 15 MG/1
15 TABLET ORAL DAILY
Qty: 30 TABLET | Refills: 0 | Status: SHIPPED | OUTPATIENT
Start: 2024-01-02

## 2024-01-02 NOTE — PROGRESS NOTES
Subjective:   Patient ID: Reggie Ferreira is a 48 year old male.    HPI  Patient having a problems for a year   On and off but in last few weeks its worse   Can not lift arm   He states that is having constant pain for last few months   History/Other:   Review of Systems    Constitutional: Negative.  Negative for activity change, appetite change, diaphoresis and fatigue.     Respiratory: Negative.  Negative for apnea, cough, chest tightness and shortness of breath.    Cardiovascular: Negative.  Negative for chest pain, palpitations and leg swelling.   Gastrointestinal: Negative.  Negative for abdominal pain.   Skin: Negative.         MSK see hpi     Current Outpatient Medications   Medication Sig Dispense Refill    sulfacetamide 10 % Ophthalmic Solution Apply 1 drop to eye every 3 (three) hours. 1 each 0    erythromycin 5 MG/GM Ophthalmic Ointment Apply 1 Application to eye nightly. 1 each 0    zolpidem 5 MG Oral Tab Take 1 tablet (5 mg total) by mouth nightly as needed for Sleep. 30 tablet 0    pyridostigmine 60 MG Oral Tab Two po am, three  at lunch and three at dinner 240 tablet 12    Multiple Vitamins-Minerals (MULTIVITAMIN OR) Take  by mouth.      AZATHIOPRINE 50 MG Oral Tab TAKE TWO TABLETS BY MOUTH TWICE DAILY (Patient not taking: Reported on 1/2/2024) 120 tablet 0     Allergies:No Known Allergies    Objective:   Physical Exam  Constitutional:       Appearance: He is well-developed.   Cardiovascular:      Rate and Rhythm: Normal rate and regular rhythm.      Heart sounds: Normal heart sounds.   Pulmonary:      Effort: Pulmonary effort is normal.      Breath sounds: Normal breath sounds.   Abdominal:      General: Bowel sounds are normal.      Palpations: Abdomen is soft.   Musculoskeletal:         General: Tenderness present.   Neurological:      Mental Status: He is alert.      Deep Tendon Reflexes: Reflexes are normal and symmetric.         Assessment & Plan:   Shoulder pain appears as tendinitis   Start  physical therapist   See physiatrist   Start nsaids   F/u in 2 weeks     No orders of the defined types were placed in this encounter.      Meds This Visit:  Requested Prescriptions      No prescriptions requested or ordered in this encounter       Imaging & Referrals:  None

## 2024-03-27 NOTE — TELEPHONE ENCOUNTER
See mychart. Per discharge papers follow up with Dr. Xochilt Shell in 13-15 days. The ABCs of the Annual Wellness Visit  Subsequent Medicare Wellness Visit    Subjective      Chastity Salgado is a 72 y.o. female who presents for a Subsequent Medicare Wellness Visit.    Tremor, worse over past few weeks, not at rest,     Anxiety attacks in last few months, depression    The following portions of the patient's history were reviewed and   updated as appropriate: allergies, current medications, past family history, past medical history, past social history, past surgical history, and problem list.    Compared to one year ago, the patient feels her physical   health is better.    Compared to one year ago, the patient feels her mental   health is better.    Recent Hospitalizations:  She was not admitted to the hospital during the last year.       Current Medical Providers:  Patient Care Team:  Marilyn Turner APRN as PCP - General (Family Medicine)  Lino Cullen MD as Consulting Physician (General Surgery)  Tu Vásquez MD as Consulting Physician (Orthopedic Surgery)  Tu Welch MD as Consulting Physician (Dermatology)  Niko Jacobsen MD as Consulting Physician (Medical Oncology)  Josseline Faria MD as Consulting Physician (Obstetrics and Gynecology)  Lino Cullen MD as Consulting Physician (General Surgery)  Mely Ramos APRN as Nurse Practitioner (Nurse Practitioner)  Neeraj jJ MD as Consulting Physician (Gastroenterology)  Zeyad Gimenez MD as Consulting Physician (Orthopedic Surgery)    Outpatient Medications Prior to Visit   Medication Sig Dispense Refill    Biotin 54171 MCG tablet Take 1 tablet by mouth Daily.      Cholecalciferol (VITAMIN D3) 25 MCG (1000 UT) capsule Take 1 capsule by mouth Daily.      cyclobenzaprine (FLEXERIL) 10 MG tablet Take 1 tablet by mouth 3 (Three) Times a Day As Needed for Muscle Spasms. 90 tablet 1    furosemide (LASIX) 20 MG tablet TAKE 1 TABLET BY MOUTH DAILY 90 tablet 1    glucose blood (FREESTYLE  LITE) test strip Once daily and as needed 100 each 12    glucose monitor monitoring kit Check glucose daily and prn 1 each 0    HYDROcodone-acetaminophen (NORCO)  MG per tablet Take 1 tablet by mouth 5 (Five) Times a Day As Needed for Severe Pain. 30 day supply. DNF 3/19/24. 150 tablet 0    KLOR-CON 20 MEQ CR tablet TAKE 1 TABLET BY MOUTH TWICE A DAY 90 tablet 1    Lancets (freestyle) lancets Check glucose daily and prn 100 each 12    Lidocaine 4 % patch Apply 1 patch topically Daily As Needed.      linaclotide (Linzess) 145 MCG capsule capsule Take 1 capsule by mouth Every Morning Before Breakfast. 30 capsule 3    lisinopril (PRINIVIL,ZESTRIL) 40 MG tablet TAKE 1 TABLET BY MOUTH DAILY 90 tablet 1    Naloxegol Oxalate (Movantik) 25 MG tablet Take 1 tablet by mouth Every Morning. 30 tablet 3    ondansetron (ZOFRAN) 4 MG tablet TAKE ONE TABLET BY MOUTH EVERY 8 HOURS AS NEEDED FOR NAUSEA OR VOMITING 60 tablet 3    DULoxetine (CYMBALTA) 60 MG capsule Take 1 capsule by mouth Daily. 90 capsule 0    ketorolac (TORADOL) 10 MG tablet Take 1 tablet by mouth Every 6 (Six) Hours As Needed for Moderate Pain. (Patient not taking: Reported on 3/27/2024) 20 tablet 0    DULoxetine (Cymbalta) 30 MG capsule Take 1 capsule by mouth Daily. (Patient not taking: Reported on 3/27/2024) 7 capsule 0    methylPREDNISolone (MEDROL) 4 MG dose pack Take as directed on package instructions. (Patient not taking: Reported on 3/27/2024) 21 tablet 0     No facility-administered medications prior to visit.       Opioid medication/s are on active medication list.  and I have evaluated her active treatment plan and pain score trends (see table).  There were no vitals filed for this visit.  I have reviewed the chart for potential of high risk medication and harmful drug interactions in the elderly.          Aspirin is not on active medication list.  Aspirin use is not indicated based on review of current medical condition/s. Risk of harm outweighs  potential benefits.  .    Patient Active Problem List   Diagnosis    Allergic rhinitis    Benign essential HTN    Arthritis of shoulder region, degenerative    Mild episode of recurrent major depressive disorder    Fatigue    Fibrositis    Generalized osteoarthritis of hand    Cannot sleep    Pain in shoulder    Adiposity    Arthritis or polyarthritis, rheumatoid    FOM (frequency of micturition)    Vitamin D deficiency    Screening for colon cancer    Pharyngoesophageal dysphagia    Hyperglycemia    Type 2 diabetes mellitus with neurologic complication, without long-term current use of insulin    Anxiety    Dizziness    Abnormal mammogram of left breast    Breast cancer, right    Chronic pain    Disorder of electrolytes    Overdose of drug    Sepsis    Urge incontinence    Localized primary osteoarthritis of left lower leg    Mixed hyperlipidemia    Dysthymia    Constipation due to pain medication    Encounter for monitoring opioid maintenance therapy    Arthritis, multiple joint involvement    Neck pain    Fever    Acute cystitis with hematuria    Urinary frequency    Menopausal symptoms    Vision changes    Cellulitis of right lower extremity    Therapeutic opioid induced constipation    Class 3 severe obesity due to excess calories with serious comorbidity and body mass index (BMI) of 40.0 to 44.9 in adult    S/P laparoscopic hysterectomy    High grade squamous intraepithelial cervical dysplasia    Chronic left shoulder pain    Essential tremor    Nausea and vomiting    Early satiety    Epigastric pain    Clinical diagnosis of COVID-19    Chest pain, unspecified type     Advance Care Planning   Advance Care Planning     Advance Directive is not on file.  ACP discussion was held with the patient during this visit. Patient does not have an advance directive, information provided.     Objective    Vitals:    03/27/24 1346   BP: 138/82   BP Location: Left arm   Patient Position: Sitting   Cuff Size: Adult   Pulse:  "91   Resp: 18   Temp: 95.5 °F (35.3 °C)   TempSrc: Temporal   SpO2: 98%   Weight: 101 kg (222 lb 8 oz)   Height: 160 cm (62.99\")     Estimated body mass index is 39.42 kg/m² as calculated from the following:    Height as of this encounter: 160 cm (62.99\").    Weight as of this encounter: 101 kg (222 lb 8 oz).    Class 2 Severe Obesity (BMI >=35 and <=39.9). Obesity-related health conditions include the following: hypertension and diabetes mellitus. Obesity is unchanged. BMI is is above average; BMI management plan is completed. We discussed portion control and increasing exercise.      Does the patient have evidence of cognitive impairment?   No    Lab Results   Component Value Date    CHLPL 173 01/03/2024    TRIG 115 01/03/2024    HDL 57 01/03/2024    LDL 96 01/03/2024    VLDL 20 01/03/2024    HGBA1C 7.70 (H) 01/03/2024          HEALTH RISK ASSESSMENT    Smoking Status:  Social History     Tobacco Use   Smoking Status Never   Smokeless Tobacco Never     Alcohol Consumption:  Social History     Substance and Sexual Activity   Alcohol Use Yes    Comment: social alcohol use     Fall Risk Screen:    STEADI Fall Risk Assessment was completed, and patient is at MODERATE risk for falls. Assessment completed on:3/27/2024    Depression Screening:      3/27/2024     1:40 PM   PHQ-2/PHQ-9 Depression Screening   Little Interest or Pleasure in Doing Things 3-->nearly every day   Feeling Down, Depressed or Hopeless 3-->nearly every day   Trouble Falling or Staying Asleep, or Sleeping Too Much 3-->nearly every day   Feeling Tired or Having Little Energy 3-->nearly every day   Poor Appetite or Overeating 3-->nearly every day   Feeling Bad about Yourself - or that You are a Failure or Have Let Yourself or Your Family Down 0-->not at all   Trouble Concentrating on Things, Such as Reading the Newspaper or Watching Television 3-->nearly every day   Moving or Speaking So Slowly that Other People Could Have Noticed? Or the Opposite - " Being So Fidgety 0-->not at all   Thoughts that You Would be Better Off Dead or of Hurting Yourself in Some Way 0-->not at all   PHQ-9: Brief Depression Severity Measure Score 18   If You Checked Off Any Problems, How Difficult Have These Problems Made It For You to Do Your Work, Take Care of Things at Home, or Get Along with Other People? somewhat difficult       Health Habits and Functional and Cognitive Screening:      3/27/2024     1:42 PM   Functional & Cognitive Status   Do you have difficulty preparing food and eating? No   Do you have difficulty bathing yourself, getting dressed or grooming yourself? No   Do you have difficulty using the toilet? No   Do you have difficulty moving around from place to place? No   Do you have trouble with steps or getting out of a bed or a chair? No   Current Diet Well Balanced Diet   Dental Exam Not up to date   Eye Exam Up to date   Exercise (times per week) 0 times per week   Current Exercises Include No Regular Exercise   Do you need help using the phone?  No   Are you deaf or do you have serious difficulty hearing?  No   Do you need help to go to places out of walking distance? No   Do you need help shopping? No   Do you need help preparing meals?  No   Do you need help with housework?  No   Do you need help with laundry? No   Do you need help taking your medications? No   Do you need help managing money? No   Do you ever drive or ride in a car without wearing a seat belt? No   Have you felt unusual stress, anger or loneliness in the last month? No   Who do you live with? Alone   If you need help, do you have trouble finding someone available to you? No   Have you been bothered in the last four weeks by sexual problems? No   Do you have difficulty concentrating, remembering or making decisions? No       Age-appropriate Screening Schedule:  Refer to the list below for future screening recommendations based on patient's age, sex and/or medical conditions. Orders for these  recommended tests are listed in the plan section. The patient has been provided with a written plan.    Health Maintenance   Topic Date Due    DXA SCAN  Never done    DIABETIC EYE EXAM  06/20/2023    INFLUENZA VACCINE  03/31/2024 (Originally 8/1/2023)    ZOSTER VACCINE (1 of 2) 06/18/2024 (Originally 2/1/2001)    COVID-19 Vaccine (2 - 2023-24 season) 02/24/2025 (Originally 9/1/2023)    TDAP/TD VACCINES (1 - Tdap) 03/27/2025 (Originally [birth year]1/1970)    RSV Vaccine - Adults (1 - 1-dose 60+ series) 02/23/2026 (Originally 2/1/2011)    HEMOGLOBIN A1C  07/03/2024    MAMMOGRAM  12/19/2024    LIPID PANEL  01/03/2025    ANNUAL WELLNESS VISIT  03/27/2025    DIABETIC FOOT EXAM  03/27/2025    URINE MICROALBUMIN  03/27/2025    BMI FOLLOWUP  03/27/2025    HEPATITIS C SCREENING  Completed    Pneumococcal Vaccine 65+  Discontinued    PAP SMEAR  Discontinued    COLORECTAL CANCER SCREENING  Discontinued                Physical Exam  Vitals reviewed.   Constitutional:       General: She is not in acute distress.     Appearance: She is well-developed. She is not diaphoretic.   HENT:      Head: Normocephalic and atraumatic.      Right Ear: Tympanic membrane, ear canal and external ear normal.      Left Ear: Tympanic membrane, ear canal and external ear normal.      Nose: Nose normal.      Mouth/Throat:      Mouth: Mucous membranes are moist.      Pharynx: Oropharynx is clear. Uvula midline. No oropharyngeal exudate.   Eyes:      Conjunctiva/sclera: Conjunctivae normal.      Pupils: Pupils are equal, round, and reactive to light.   Cardiovascular:      Rate and Rhythm: Normal rate and regular rhythm.      Heart sounds: Normal heart sounds. No murmur heard.     No friction rub. No gallop.   Pulmonary:      Effort: Pulmonary effort is normal. No respiratory distress.      Breath sounds: Normal breath sounds. No wheezing or rales.   Abdominal:      General: Bowel sounds are normal. There is no distension.      Palpations: Abdomen is soft.       Tenderness: There is no abdominal tenderness.   Musculoskeletal:      Cervical back: Neck supple.   Lymphadenopathy:      Cervical: No cervical adenopathy.   Skin:     General: Skin is warm and dry.   Neurological:      Mental Status: She is alert and oriented to person, place, and time.   Psychiatric:         Mood and Affect: Mood normal.         CMS Preventative Services Quick Reference  Risk Factors Identified During Encounter:    Immunizations Discussed/Encouraged: Tdap    The above risks/problems have been discussed with the patient.  Pertinent information has been shared with the patient in the After Visit Summary.    Diagnoses and all orders for this visit:    1. Medicare annual wellness visit, subsequent (Primary)    2. Type 2 diabetes mellitus with hyperglycemia, without long-term current use of insulin  -     Microalbumin / Creatinine Urine Ratio - Urine, Clean Catch    3. Major depressive disorder, recurrent, moderate    4. Rosacea    Other orders  -     DULoxetine (Cymbalta) 30 MG capsule; Take 1 capsule by mouth Daily.  Dispense: 30 capsule; Refill: 1  -     buPROPion XL (Wellbutrin XL) 150 MG 24 hr tablet; Take 1 tablet by mouth Daily.  Dispense: 30 tablet; Refill: 1  -     doxycycline (MONODOX) 100 MG capsule; Take 1 capsule by mouth Daily.  Dispense: 30 capsule; Refill: 0      Information/counseling provided to the patient regarding periodic andrea maintenance recommendations, including but not limited to immunizations, diet/exercise/healthy lifestyle, laboratory, and other screenings. BMI is discussed. Appropriate exercise, diet, and weight plans are discussed.    Depression: worsening tremor, discussed this may be related to cymbalta, will reduce to 30 mg daily, start wellbutrin 150 mg, previously tolerated. Follow up in six months    Rosacea: skin redness and peeling on nose and chin, will start 30 day cycle of doxycycline daily, continue metronidazole    Diabetes: urine microalbumin  today          Follow Up:   Next Medicare Wellness visit to be scheduled in 1 year.      An After Visit Summary and PPPS were made available to the patient.

## 2025-05-15 ENCOUNTER — APPOINTMENT (OUTPATIENT)
Dept: GENERAL RADIOLOGY | Facility: HOSPITAL | Age: 50
End: 2025-05-15
Attending: EMERGENCY MEDICINE
Payer: MEDICAID

## 2025-05-15 ENCOUNTER — APPOINTMENT (OUTPATIENT)
Dept: CT IMAGING | Facility: HOSPITAL | Age: 50
End: 2025-05-15
Attending: EMERGENCY MEDICINE
Payer: MEDICAID

## 2025-05-15 ENCOUNTER — HOSPITAL ENCOUNTER (INPATIENT)
Facility: HOSPITAL | Age: 50
LOS: 1 days | Discharge: HOME | End: 2025-05-16
Attending: EMERGENCY MEDICINE | Admitting: HOSPITALIST
Payer: MEDICAID

## 2025-05-15 ENCOUNTER — HOSPITAL ENCOUNTER (INPATIENT)
Facility: HOSPITAL | Age: 50
LOS: 1 days | Discharge: HOME HEALTH | End: 2025-05-16
Attending: EMERGENCY MEDICINE | Admitting: HOSPITALIST
Payer: MEDICAID

## 2025-05-15 DIAGNOSIS — G70.00 MYASTHENIA GRAVIS (HCC): Primary | ICD-10-CM

## 2025-05-15 PROCEDURE — 73030 X-RAY EXAM OF SHOULDER: CPT | Performed by: EMERGENCY MEDICINE

## 2025-05-15 PROCEDURE — 70450 CT HEAD/BRAIN W/O DYE: CPT | Performed by: EMERGENCY MEDICINE

## 2025-05-15 PROCEDURE — 71045 X-RAY EXAM CHEST 1 VIEW: CPT | Performed by: EMERGENCY MEDICINE

## 2025-05-15 PROCEDURE — 73560 X-RAY EXAM OF KNEE 1 OR 2: CPT | Performed by: EMERGENCY MEDICINE

## 2025-05-15 RX ORDER — KETOROLAC TROMETHAMINE 30 MG/ML
30 INJECTION, SOLUTION INTRAMUSCULAR; INTRAVENOUS ONCE
Status: COMPLETED | OUTPATIENT
Start: 2025-05-15 | End: 2025-05-15

## 2025-05-16 VITALS
HEART RATE: 70 BPM | SYSTOLIC BLOOD PRESSURE: 118 MMHG | HEIGHT: 72 IN | BODY MASS INDEX: 26.68 KG/M2 | WEIGHT: 197 LBS | TEMPERATURE: 98 F | RESPIRATION RATE: 16 BRPM | DIASTOLIC BLOOD PRESSURE: 69 MMHG | OXYGEN SATURATION: 97 %

## 2025-05-16 LAB
ANION GAP SERPL CALC-SCNC: 13 MMOL/L (ref 0–18)
ATRIAL RATE: 72 BPM
BASOPHILS # BLD AUTO: 0.04 X10(3) UL (ref 0–0.2)
BASOPHILS NFR BLD AUTO: 0.7 %
BUN BLD-MCNC: 8 MG/DL (ref 9–23)
BUN/CREAT SERPL: 10.4 (ref 10–20)
CALCIUM BLD-MCNC: 9.4 MG/DL (ref 8.7–10.4)
CHLORIDE SERPL-SCNC: 106 MMOL/L (ref 98–112)
CO2 SERPL-SCNC: 23 MMOL/L (ref 21–32)
CREAT BLD-MCNC: 0.77 MG/DL (ref 0.7–1.3)
DEPRECATED RDW RBC AUTO: 41.6 FL (ref 35.1–46.3)
EGFRCR SERPLBLD CKD-EPI 2021: 109 ML/MIN/1.73M2 (ref 60–?)
EOSINOPHIL # BLD AUTO: 0.07 X10(3) UL (ref 0–0.7)
EOSINOPHIL NFR BLD AUTO: 1.2 %
ERYTHROCYTE [DISTWIDTH] IN BLOOD BY AUTOMATED COUNT: 12.7 % (ref 11–15)
GLUCOSE BLD-MCNC: 86 MG/DL (ref 70–99)
HCT VFR BLD AUTO: 41.3 % (ref 39–53)
HGB BLD-MCNC: 14.1 G/DL (ref 13–17.5)
IMM GRANULOCYTES # BLD AUTO: 0.01 X10(3) UL (ref 0–1)
IMM GRANULOCYTES NFR BLD: 0.2 %
LYMPHOCYTES # BLD AUTO: 1.3 X10(3) UL (ref 1–4)
LYMPHOCYTES NFR BLD AUTO: 23.2 %
MAGNESIUM SERPL-MCNC: 2.2 MG/DL (ref 1.6–2.6)
MCH RBC QN AUTO: 30.3 PG (ref 26–34)
MCHC RBC AUTO-ENTMCNC: 34.1 G/DL (ref 31–37)
MCV RBC AUTO: 88.8 FL (ref 80–100)
MONOCYTES # BLD AUTO: 0.5 X10(3) UL (ref 0.1–1)
MONOCYTES NFR BLD AUTO: 8.9 %
NEUTROPHILS # BLD AUTO: 3.69 X10 (3) UL (ref 1.5–7.7)
NEUTROPHILS # BLD AUTO: 3.69 X10(3) UL (ref 1.5–7.7)
NEUTROPHILS NFR BLD AUTO: 65.8 %
OSMOLALITY SERPL CALC.SUM OF ELEC: 292 MOSM/KG (ref 275–295)
P AXIS: 66 DEGREES
P-R INTERVAL: 154 MS
PLATELET # BLD AUTO: 150 10(3)UL (ref 150–450)
POTASSIUM SERPL-SCNC: 3.9 MMOL/L (ref 3.5–5.1)
Q-T INTERVAL: 414 MS
QRS DURATION: 108 MS
QTC CALCULATION (BEZET): 453 MS
R AXIS: -1 DEGREES
RBC # BLD AUTO: 4.65 X10(6)UL (ref 4.3–5.7)
SODIUM SERPL-SCNC: 142 MMOL/L (ref 136–145)
T AXIS: 55 DEGREES
VENTRICULAR RATE: 72 BPM
WBC # BLD AUTO: 5.6 X10(3) UL (ref 4–11)

## 2025-05-16 PROCEDURE — 99223 1ST HOSP IP/OBS HIGH 75: CPT | Performed by: OTHER

## 2025-05-16 PROCEDURE — 99235 HOSP IP/OBS SAME DATE MOD 70: CPT | Performed by: HOSPITALIST

## 2025-05-16 RX ORDER — HEPARIN SODIUM 5000 [USP'U]/ML
5000 INJECTION, SOLUTION INTRAVENOUS; SUBCUTANEOUS EVERY 12 HOURS
Status: DISCONTINUED | OUTPATIENT
Start: 2025-05-16 | End: 2025-05-16

## 2025-05-16 RX ORDER — PYRIDOSTIGMINE BROMIDE 60 MG/1
60 TABLET ORAL EVERY 8 HOURS SCHEDULED
Status: DISCONTINUED | OUTPATIENT
Start: 2025-05-16 | End: 2025-05-16

## 2025-05-16 RX ORDER — PANTOPRAZOLE SODIUM 40 MG/1
40 TABLET, DELAYED RELEASE ORAL
Status: DISCONTINUED | OUTPATIENT
Start: 2025-05-16 | End: 2025-05-16

## 2025-05-16 RX ORDER — PREDNISONE 5 MG/1
TABLET ORAL
Qty: 68 TABLET | Refills: 0 | Status: SHIPPED | OUTPATIENT
Start: 2025-05-16 | End: 2025-06-25

## 2025-05-16 RX ORDER — AZATHIOPRINE 50 MG/1
TABLET ORAL
Qty: 198 TABLET | Refills: 0 | Status: SHIPPED | OUTPATIENT
Start: 2025-05-16 | End: 2025-07-15

## 2025-05-16 RX ORDER — PREDNISONE 5 MG/1
10 TABLET ORAL
Status: DISCONTINUED | OUTPATIENT
Start: 2025-05-16 | End: 2025-05-16

## 2025-05-16 RX ORDER — AZATHIOPRINE 50 MG/1
100 TABLET ORAL 2 TIMES DAILY
Status: DISCONTINUED | OUTPATIENT
Start: 2025-05-16 | End: 2025-05-16

## 2025-05-16 RX ORDER — ONDANSETRON 2 MG/ML
4 INJECTION INTRAMUSCULAR; INTRAVENOUS EVERY 6 HOURS PRN
Status: DISCONTINUED | OUTPATIENT
Start: 2025-05-16 | End: 2025-05-16

## 2025-05-16 RX ORDER — MAGNESIUM HYDROXIDE/ALUMINUM HYDROXICE/SIMETHICONE 120; 1200; 1200 MG/30ML; MG/30ML; MG/30ML
30 SUSPENSION ORAL 4 TIMES DAILY PRN
Status: DISCONTINUED | OUTPATIENT
Start: 2025-05-16 | End: 2025-05-16

## 2025-05-16 RX ORDER — KETOROLAC TROMETHAMINE 30 MG/ML
30 INJECTION, SOLUTION INTRAMUSCULAR; INTRAVENOUS EVERY 6 HOURS PRN
Status: DISCONTINUED | OUTPATIENT
Start: 2025-05-16 | End: 2025-05-16

## 2025-05-16 RX ORDER — ACETAMINOPHEN 325 MG/1
650 TABLET ORAL EVERY 6 HOURS PRN
Status: DISCONTINUED | OUTPATIENT
Start: 2025-05-16 | End: 2025-05-16

## 2025-05-16 RX ORDER — HYDRALAZINE HYDROCHLORIDE 20 MG/ML
10 INJECTION INTRAMUSCULAR; INTRAVENOUS EVERY 4 HOURS PRN
Status: DISCONTINUED | OUTPATIENT
Start: 2025-05-16 | End: 2025-05-16

## 2025-05-16 RX ORDER — ZOLPIDEM TARTRATE 5 MG/1
5 TABLET ORAL NIGHTLY PRN
Status: DISCONTINUED | OUTPATIENT
Start: 2025-05-16 | End: 2025-05-16

## 2025-05-16 RX ORDER — PYRIDOSTIGMINE BROMIDE 60 MG/1
TABLET ORAL
Qty: 240 TABLET | Refills: 5 | Status: SHIPPED | OUTPATIENT
Start: 2025-05-16 | End: 2025-11-12
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
## 2025-05-16 NOTE — PLAN OF CARE
Progress adequate for discharge to home per MDs. Discharge instructions given and verbalized understanding and understands follow up instructions and scripts at his pharmacy.
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
## 2025-05-16 NOTE — PLAN OF CARE
Reggie is alert x 4. Minimal pain. Lidocaine patch to left posterior shoulder. Up with supervision. Fall precautions are in place. Discharge home when medically cleared.

## 2025-05-16 NOTE — PROGRESS NOTES
05/16/25 0820   Spontaneous Parameters   $ Spontaneous Vital Capacity 4.25   Negative Inspiratory Force -42     1230: VC 3.87 L and  NIF -60.

## 2025-05-16 NOTE — RESPIRATORY THERAPY NOTE
Pt performed NIF and FVC adequately. Tolerated well. Results below.         05/15/25 2355   Pulmonary Mechanics   $ FVC (Liters) 3.75   Negative Inspiratory Force -40
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
## 2025-05-16 NOTE — PROGRESS NOTES
NIF/VC was performed with the following data obtained:    05/16/25 1515   Spontaneous Parameters   $ Spontaneous Vital Capacity 5.03   Negative Inspiratory Force -60
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
## 2025-05-16 NOTE — ED QUICK NOTES
Orders for admission, patient is aware of plan and ready to go upstairs. Any questions, please call ED RN ely at extension [phone].     Patient Covid vaccination status: Fully vaccinated     COVID Test Ordered in ED: None    COVID Suspicion at Admission: N/A    Running Infusions: Medication Infusions[1] None    Mental Status/LOC at time of transport: A&Ox4    Other pertinent information:   CIWA score: N/A   NIH score:  N/A             [1]
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
## 2025-05-19 ENCOUNTER — PATIENT OUTREACH (OUTPATIENT)
Dept: CASE MANAGEMENT | Age: 50
End: 2025-05-19
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
## 2025-05-19 NOTE — PROGRESS NOTES
Left message on mailbox for patient to call care manager back for transitional care management/hospital follow-up call.  Care  information included in message.    1st attempt

## 2025-06-19 ENCOUNTER — OFFICE VISIT (OUTPATIENT)
Dept: FAMILY MEDICINE CLINIC | Facility: CLINIC | Age: 50
End: 2025-06-19

## 2025-06-19 VITALS
BODY MASS INDEX: 28.04 KG/M2 | DIASTOLIC BLOOD PRESSURE: 85 MMHG | WEIGHT: 207 LBS | HEART RATE: 60 BPM | SYSTOLIC BLOOD PRESSURE: 121 MMHG | RESPIRATION RATE: 20 BRPM | OXYGEN SATURATION: 99 % | TEMPERATURE: 96 F | HEIGHT: 72 IN

## 2025-06-19 DIAGNOSIS — M79.602 LEFT ARM PAIN: Primary | ICD-10-CM

## 2025-06-19 PROCEDURE — 99213 OFFICE O/P EST LOW 20 MIN: CPT | Performed by: FAMILY MEDICINE
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
## 2025-06-30 ENCOUNTER — APPOINTMENT (OUTPATIENT)
Dept: PHYSICAL THERAPY | Facility: HOSPITAL | Age: 50
End: 2025-06-30
Attending: FAMILY MEDICINE
Payer: COMMERCIAL

## 2025-07-03 ENCOUNTER — APPOINTMENT (OUTPATIENT)
Dept: PHYSICAL THERAPY | Facility: HOSPITAL | Age: 50
End: 2025-07-03
Attending: FAMILY MEDICINE

## 2025-07-03 ENCOUNTER — OFFICE VISIT (OUTPATIENT)
Dept: PHYSICAL THERAPY | Age: 50
End: 2025-07-03
Attending: FAMILY MEDICINE
Payer: COMMERCIAL

## 2025-07-03 DIAGNOSIS — M79.602 LEFT ARM PAIN: Primary | ICD-10-CM

## 2025-07-03 PROCEDURE — 97162 PT EVAL MOD COMPLEX 30 MIN: CPT | Performed by: PHYSICAL THERAPIST

## 2025-07-03 PROCEDURE — 97110 THERAPEUTIC EXERCISES: CPT | Performed by: PHYSICAL THERAPIST
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
## 2025-07-04 NOTE — PATIENT INSTRUCTIONS
Sitting posture correction with lumbar roll (recommended)    C/s retraction 2 x 10 reps x 4-5x/day    Supinelying with/without pillow under head to shoulders x 2-3 mins prior to sleeping

## 2025-07-07 ENCOUNTER — APPOINTMENT (OUTPATIENT)
Dept: PHYSICAL THERAPY | Facility: HOSPITAL | Age: 50
End: 2025-07-07
Attending: FAMILY MEDICINE

## 2025-07-08 ENCOUNTER — TELEPHONE (OUTPATIENT)
Dept: PHYSICAL THERAPY | Facility: HOSPITAL | Age: 50
End: 2025-07-08

## 2025-07-09 ENCOUNTER — APPOINTMENT (OUTPATIENT)
Dept: PHYSICAL THERAPY | Facility: HOSPITAL | Age: 50
End: 2025-07-09
Attending: FAMILY MEDICINE

## 2025-07-09 ENCOUNTER — APPOINTMENT (OUTPATIENT)
Dept: PHYSICAL THERAPY | Age: 50
End: 2025-07-09
Attending: FAMILY MEDICINE
Payer: COMMERCIAL

## 2025-07-14 ENCOUNTER — APPOINTMENT (OUTPATIENT)
Dept: PHYSICAL THERAPY | Age: 50
End: 2025-07-14
Attending: FAMILY MEDICINE
Payer: COMMERCIAL

## 2025-07-14 ENCOUNTER — APPOINTMENT (OUTPATIENT)
Dept: PHYSICAL THERAPY | Facility: HOSPITAL | Age: 50
End: 2025-07-14
Attending: FAMILY MEDICINE

## 2025-07-14 ENCOUNTER — OFFICE VISIT (OUTPATIENT)
Dept: PHYSICAL THERAPY | Age: 50
End: 2025-07-14
Attending: FAMILY MEDICINE
Payer: COMMERCIAL

## 2025-07-14 PROCEDURE — 97110 THERAPEUTIC EXERCISES: CPT

## 2025-07-15 NOTE — PROGRESS NOTES
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  He stated doing his exercises 3-4x/day in sitting and standing.       Pain: 5/10     Objective  Pretest symptoms: 5/10 (L>R Neck and L shoudler)    CROM:                                                                          Flexion: Moderate Loss, Inc, NW (b/w shoulder blades) Protrusion: Nil loss, NE                                            Retraction: Minimal loss, NE                                                             Ext: Minimal Loss, NE                                                            (R) Rotation: Minimal loss, Inc, NW (L UT 4/10)                                (L) Rotation: Nil loss, NE                                                            (L) SB: Moderate loss, Inc, NW (L UT 4/10)                                (R) SB: Moderate loss, Inc, NW (L UT 4/10)          Assessment  Reggie continues to ernesto responding to his neck DP exercise toward lower cervical extension in an unloaded position.  OTD after implementing his exercises in supine he exhibited an improvement in his CROM toward Flexion (min-nil loss), (R) Rot (Nil loss), L/R SB (Min loss) and abolishment of symptoms during flexion, retraction, (R) Rot and SB.  He was instructed to be mindful of his sitting/standing posture while also using his lumbar support pillow during all sitting to augment his posture.  He was instructed to continue with his exercises in an unloaded position only 3-4x /day. He  understood all HEP instruction and will be progressed during the next session if appropriate with an introduction to postural strengthening. All ?'s answered on this date.    Goals (to be met in 8 visits)   1. Patient to consistently perform their HEP and it's progression to maintain their improved condition.  2.  Patient to have reduced, centralized, and abolished symptoms in the neck to enable easier home, work, functional, and recreational activities.   3. Patient to have WNL of CROM in all directions to be able to dress up, reach forward/overhead, drive, turn his head, lay down, sleep at night, lift, carry, reach, and work without producing symptoms in the (L) neck, scapula, and (L) shoulder.   4. Patient to consistently have good posture to promote their symptom free condition.           Plan  Plan to reassess next session, treatment will include: Directional preference exericse, posture correction, postural strengthening/stability exercises, eccentric/concentirc strengthening, and HEP    Treatment Last 4 Visits  Treatment Day: 2       7/3/2025 7/14/2025   Spine Treatment   Therapeutic Exercise Sitting posture correction with lumbar roll    C/s retraction 2 x 10 reps; Inc, NW      + self OP x 10 reps; Dec, B (increased CROM increased toward (L) rotation/SB, flexion, and extension with less endrange pain/ache)    Supinelying x 1 min; NE    + 1 pillow head to shoulders x 1 min; NE    + c/s retraction with self OP/guide x 10 reps; NE SciFit UE Only Lv 2 Fwd/Bkwd x 5 min ea; NE    C/s retraction x 10 reps; Inc, NW (corrected technique)    - recheck CROM: No change from initial    C/s Retraction with guide/self OP x 10 reps; Inc, NW    - recheck CROM: No change from initial    Supine lying without pillow x 2 min; Dec, A    C/s retraction in supine lying x 10 reps; NE  + self OP x 10 reps; NE    - recheck CROM: Improved ROM toward Flexion to Min loss, (R) Rot Min loss.    C/s Manual Traction 2 sets x 30 cts; NE (felt  good)    Supine lying Manual C/s traction with retraction by therapist x 10 reps; NE ([place] good)    - recheck CROM: improved ROM and abolishment of symptoms toward flexion to Nil loss, (R) Rot to Nil loss, (R/L) SB lessened symptoms.   Therapeutic Exercise Minutes 20 45   Evaluation Minutes 30    Total Time Of Timed Procedures 20 45   Total Time Of Service-Based Procedures 30 0   Total Treatment Time 50 45   HEP Sitting posture correction with lumbar roll (recommended)    C/s retraction 2 x 10 reps x 4-5x/day    Supinelying with/without pillow under head to shoulders x 2-3 mins prior to sleeping   Supine lying C/s retraction with self OP x 10 reps; 3-4x/day        HEP  Supine lying C/s retraction with self OP x 10 reps; 3-4x/day      Charges  Therapy Ex x 3       On this date patient was seen by Brayden Ross PTA under the supervision of Alo Randle PT. Cert MDT.

## 2025-07-16 ENCOUNTER — APPOINTMENT (OUTPATIENT)
Dept: PHYSICAL THERAPY | Facility: HOSPITAL | Age: 50
End: 2025-07-16
Attending: FAMILY MEDICINE

## 2025-07-18 ENCOUNTER — OFFICE VISIT (OUTPATIENT)
Dept: PHYSICAL THERAPY | Age: 50
End: 2025-07-18
Attending: FAMILY MEDICINE
Payer: COMMERCIAL

## 2025-07-18 ENCOUNTER — TELEPHONE (OUTPATIENT)
Dept: PHYSICAL THERAPY | Age: 50
End: 2025-07-18

## 2025-07-18 PROCEDURE — 97110 THERAPEUTIC EXERCISES: CPT

## 2025-07-18 NOTE — PROGRESS NOTES
Patient: Reggie Ferreira (49 year old, male) Referring Provider:  Insurance:   Diagnosis: Left arm pain (M79.602) Stephanie Preston  COMMERCIAL   Date of Surgery: NA Next MD visit:  MEDICAID PENDING   Precautions:  None NA Referral Information:    Date of Evaluation: Req: 8, Auth: 8, Exp: 6/19/2026 07/03/25 POC Auth Visits:  8       Today's Date   7/18/2025    Subjective  Reggie cites feeling \"a little better\" compared to his last visit, he notes less intensity in symptoms felt in the medial aspect of the (L) scapula and lower neck, including around the upper arm.  He said that when he moves his arm a \"certain way, such as reaching to the side\" he will feel an exacerbation of symptoms in the upper arm.  He cited doing his HEP 3+ x/day.       Pain: 2/10     Objective  Pretest symptoms: 2/10 (L shoudler blade and upper arm)                                                                       CROM:                                                                          Flexion: Nil Loss,NE (stretch)                             Protrusion: Nil loss, NE                                            Retraction: Nil loss, Inc, NW (L ear)                                                             Ext: Nil Loss, NE                                                            (R) Rotation: Nil loss, NE                                                      (L) Rotation: Nil loss, NE                                                        (L) SB: Minimal loss, NE (tight opposite)                                           (R) SB: Moderate loss, Inc, NW (L shoulder)          Assessment  Reggie continues to be responding to his neck DP exercise toward lower cervical extension. His DP exercise was progressed to C/s ext and he exhibited an improvement in his CROM toward (L) SB to Nil loss, (R) SB to Minimal loss with an abolishment of symptoms in the medial aspect of the (L) scapulae.  He was instructed to be mindful of his sitting/standing  posture while also using his lumbar support pillow during all sitting to augment his posture.  He was instructed to continue with his updated HEP 3-4x/day. He understood all HEP instruction and will be progressed during the next session if appropriate with an introduction to postural strengthening. All questions were answered on this date.    Goals (to be met in 8 visits)   1. Patient to consistently perform their HEP and it's progression to maintain their improved condition.  2.  Patient to have reduced, centralized, and abolished symptoms in the neck to enable easier home, work, functional, and recreational activities.   3. Patient to have WNL of CROM in all directions to be able to dress up, reach forward/overhead, drive, turn his head, lay down, sleep at night, lift, carry, reach, and work without producing symptoms in the (L) neck, scapula, and (L) shoulder.   4. Patient to consistently have good posture to promote their symptom free condition.               Plan  Plan to reassess next session, treatment will include: Directional preference exericse, posture correction, postural strengthening/stability exercises, eccentric/concentirc strengthening, and HEP    Treatment Last 4 Visits  Treatment Day: 3       7/3/2025 7/14/2025 7/18/2025   Spine Treatment   Therapeutic Exercise Sitting posture correction with lumbar roll    C/s retraction 2 x 10 reps; Inc, NW      + self OP x 10 reps; Dec, B (increased CROM increased toward (L) rotation/SB, flexion, and extension with less endrange pain/ache)    Supinelying x 1 min; NE    + 1 pillow head to shoulders x 1 min; NE    + c/s retraction with self OP/guide x 10 reps; NE SciFit UE Only Lv 2 Fwd/Bkwd x 5 min ea; NE    C/s retraction x 10 reps; Inc, NW (corrected technique)    - recheck CROM: No change from initial    C/s Retraction with guide/self OP x 10 reps; Inc, NW    - recheck CROM: No change from initial    Supine lying without pillow x 2 min; Dec, A    C/s  retraction in supine lying x 10 reps; NE  + self OP x 10 reps; NE    - recheck CROM: Improved ROM toward Flexion to Min loss, (R) Rot Min loss.    C/s Manual Traction 2 sets x 30 cts; NE (felt good)    Supine lying Manual C/s traction with retraction by therapist x 10 reps; NE ([place] good)    - recheck CROM: improved ROM and abolishment of symptoms toward flexion to Nil loss, (R) Rot to Nil loss, (R/L) SB lessened symptoms. SciFit UE only Lv3 Fwd/Bkwd x 5 min; Inc, NW (L shoulder)    - sitting posture correction with lumbar roll    C/s retraction x 10 reps; NE  + self OP x 10 reps; Inc, NW    - recheck CROM: No change    C/s Ext x 10 reps; NE    - recheck CROM: improved CROM to Nil loss during (L) SB, Min loss with (R) SB.    Prone: (B) UE Ext 10 reps x 10 cts; NE tired    Prone: (B) Horz Abd 10 reps x 10 cts; NE tired    Prone: Sphynx Position on fists x 2 min; NE (stretch)    - felt looser in neck and symptoms in medial aspect of (L) shoulder blade abolished       Therapeutic Exercise Minutes 20 45 45   Evaluation Minutes 30     Total Time Of Timed Procedures 20 45 45   Total Time Of Service-Based Procedures 30 0 0   Total Treatment Time 50 45 45   HEP Sitting posture correction with lumbar roll (recommended)    C/s retraction 2 x 10 reps x 4-5x/day    Supinelying with/without pillow under head to shoulders x 2-3 mins prior to sleeping   Supine lying C/s retraction with self OP x 10 reps; 3-4x/day C/s ext x 10 reps x 2 cts; 3x/day    Sphynx onto fists x 2 min; 2x/day        HEP  C/s ext x 10 reps x 2 cts; 3x/day    Sphynx onto fists x 2 min; 2x/day    Charges  Therapy Ex x 3         On this date patient was seen by Brayden oRss PTA under the supervision of Alo Randle PT. Cert MDT.

## 2025-07-21 ENCOUNTER — APPOINTMENT (OUTPATIENT)
Dept: PHYSICAL THERAPY | Age: 50
End: 2025-07-21
Attending: FAMILY MEDICINE
Payer: COMMERCIAL

## 2025-07-25 ENCOUNTER — OFFICE VISIT (OUTPATIENT)
Dept: PHYSICAL THERAPY | Age: 50
End: 2025-07-25
Attending: FAMILY MEDICINE
Payer: COMMERCIAL

## 2025-07-25 PROCEDURE — 97110 THERAPEUTIC EXERCISES: CPT

## 2025-07-25 NOTE — PROGRESS NOTES
Patient: Reggie Ferreira (49 year old, male) Referring Provider:  Insurance:   Diagnosis: Left arm pain (M79.602) Stephanie Preston  COMMERCIAL   Date of Surgery: NA Next MD visit:  MEDICAID PENDING   Precautions:  None NA Referral Information:    Date of Evaluation: Req: 8, Auth: 8, Exp: 6/19/2026 07/03/25 POC Auth Visits:  8       Today's Date   7/25/2025    Subjective  Reggie stated feeling better in the neck on this date, he notes being able to sleep without pain.  He continues to note pain in the upper (L) UE when reaching OH or behind him - described as a \"sharp\" pain. He cites diong his HEP throughout the day.       Pain: 0/10     Objective  Pretest symptoms: 0/10                                                         CROM:                                                                          Flexion: Nil Loss,NE (stretch)                             Protrusion: Nil loss, NE                                            Retraction: Nil loss, NE                                                                      Ext: Nil Loss, NE                                                            (R) Rotation: Nil loss, NE                                                      (L) Rotation: Nil loss, NE                                                        (L) SB: Nil loss, NE (tight opposite)                                           (R) SB: Nil loss, NE (stretch opposite side)                                   /// (L) Shoulder: 0/10 (pain during motion)                                   Flexion: Nil loss, P, NW                                                                 Extension: Nil loss, NE                                                                  Abduction: Nil loss, P, NW                                                                  IR: Min-Moderate loss; P, NW                                                         ER: Min loss, P, NW            Assessment  Reggie still responds to his neck DP exercises  toward lower cervical extension; on this date was progressed to C/s ext with lean back - in which he did not note any production of symptoms. The symptoms in b/w the shoulder blades and neck has abolished since his previous session.  Today he was introduced to (L) shoulder DP exercise to aide in alleviation/reduction of symptoms in the affected shoulder. He was provided (L) shoulder IR with self OP and exhibited an improvement in AROM toward ER to Nil loss, with an abolishment of symptoms during abduction.  His prescribed HEP included (L) shoulder exercises. He verbalized understanding/agreement and all questions answered at this time.    Goals (to be met in 8 visits)   1. Patient to consistently perform their HEP and it's progression to maintain their improved condition.  2.  Patient to have reduced, centralized, and abolished symptoms in the neck to enable easier home, work, functional, and recreational activities.   3. Patient to have WNL of CROM in all directions to be able to dress up, reach forward/overhead, drive, turn his head, lay down, sleep at night, lift, carry, reach, and work without producing symptoms in the (L) neck, scapula, and (L) shoulder.   4. Patient to consistently have good posture to promote their symptom free condition.                   Plan  Plan to reassess next session, treatment will include: Directional preference exericse, posture correction, postural strengthening/stability exercises, eccentric/concentirc strengthening, and HEP    Treatment Last 4 Visits  Treatment Day: 4       7/3/2025 7/14/2025 7/18/2025 7/25/2025   Spine Treatment   Therapeutic Exercise Sitting posture correction with lumbar roll    C/s retraction 2 x 10 reps; Inc, NW      + self OP x 10 reps; Dec, B (increased CROM increased toward (L) rotation/SB, flexion, and extension with less endrange pain/ache)    Supinelying x 1 min; NE    + 1 pillow head to shoulders x 1 min; NE    + c/s retraction with self OP/guide x  10 reps; NE SciFit UE Only Lv 2 Fwd/Bkwd x 5 min ea; NE    C/s retraction x 10 reps; Inc, NW (corrected technique)    - recheck CROM: No change from initial    C/s Retraction with guide/self OP x 10 reps; Inc, NW    - recheck CROM: No change from initial    Supine lying without pillow x 2 min; Dec, A    C/s retraction in supine lying x 10 reps; NE  + self OP x 10 reps; NE    - recheck CROM: Improved ROM toward Flexion to Min loss, (R) Rot Min loss.    C/s Manual Traction 2 sets x 30 cts; NE (felt good)    Supine lying Manual C/s traction with retraction by therapist x 10 reps; NE ([place] good)    - recheck CROM: improved ROM and abolishment of symptoms toward flexion to Nil loss, (R) Rot to Nil loss, (R/L) SB lessened symptoms. SciFit UE only Lv3 Fwd/Bkwd x 5 min; Inc, NW (L shoulder)    - sitting posture correction with lumbar roll    C/s retraction x 10 reps; NE  + self OP x 10 reps; Inc, NW    - recheck CROM: No change    C/s Ext x 10 reps; NE    - recheck CROM: improved CROM to Nil loss during (L) SB, Min loss with (R) SB.    Prone: (B) UE Ext 10 reps x 10 cts; NE tired    Prone: (B) Horz Abd 10 reps x 10 cts; NE tired    Prone: Sphynx Position on fists x 2 min; NE (stretch)    - felt looser in neck and symptoms in medial aspect of (L) shoulder blade abolished     NuStep UE only Lv 1-2 x 10 min; NE    C/s extension x 10 reps; NE    (L) Shoulder AROM test: limited in IR/ER, symptoms during movment and at end range in Flex/Abd/ER.    (L) Shoulder IR without self OP 2 sets x 10 reps; NE    - retest (L) shoulder shoulder: symptoms abolished during abduction, same with flexion/ER.    (L) Shoulder IR with self OP x 10 reps; NE    - - retest (L) shoulder shoulder: remain abolished in abduction, less during flexion and ER, increased AROM to Nil loss with ER.    Prone: (B) UE Ext, Horz Abd, [place] 10 reps x 10 cts; NE tired    C/s ext + lean back over chair x 10 reps; NE   Therapeutic Exercise Minutes 20 45 45 45    Evaluation Minutes 30      Total Time Of Timed Procedures 20 45 45 45   Total Time Of Service-Based Procedures 30 0 0 0   Total Treatment Time 50 45 45 45   HEP Sitting posture correction with lumbar roll (recommended)    C/s retraction 2 x 10 reps x 4-5x/day    Supinelying with/without pillow under head to shoulders x 2-3 mins prior to sleeping   Supine lying C/s retraction with self OP x 10 reps; 3-4x/day C/s ext x 10 reps x 2 cts; 3x/day    Sphynx onto fists x 2 min; 2x/day C/s ext + lean back over chair x 10 reps; 2-3x/day    (L) shoulder IR with self OP x 10 reps; 3x/day        HEP  C/s ext + lean back over chair x 10 reps; 2-3x/day    (L) shoulder IR with self OP x 10 reps; 3x/day    Charges  Therapy Ex x 3       On this date patient was seen by Brayden Ross PTA under the supervision of Alo Randle PT. Cert MDT.

## 2025-07-28 ENCOUNTER — OFFICE VISIT (OUTPATIENT)
Dept: PHYSICAL THERAPY | Age: 50
End: 2025-07-28
Attending: FAMILY MEDICINE
Payer: COMMERCIAL

## 2025-07-28 PROCEDURE — 97110 THERAPEUTIC EXERCISES: CPT

## 2025-07-31 ENCOUNTER — OFFICE VISIT (OUTPATIENT)
Dept: PHYSICAL THERAPY | Age: 50
End: 2025-07-31
Attending: FAMILY MEDICINE
Payer: COMMERCIAL

## 2025-07-31 PROCEDURE — 97110 THERAPEUTIC EXERCISES: CPT | Performed by: PHYSICAL THERAPIST

## 2025-08-11 ENCOUNTER — OFFICE VISIT (OUTPATIENT)
Dept: PHYSICAL THERAPY | Age: 50
End: 2025-08-11
Attending: FAMILY MEDICINE

## 2025-08-11 PROCEDURE — 97110 THERAPEUTIC EXERCISES: CPT

## 2025-08-22 ENCOUNTER — OFFICE VISIT (OUTPATIENT)
Dept: PHYSICAL THERAPY | Age: 50
End: 2025-08-22
Attending: FAMILY MEDICINE

## 2025-08-22 PROCEDURE — 97110 THERAPEUTIC EXERCISES: CPT

## (undated) DIAGNOSIS — Z12.11 COLON CANCER SCREENING: Primary | ICD-10-CM

## (undated) DIAGNOSIS — N44.2 TESTICULAR CYST: Primary | ICD-10-CM

## (undated) DEVICE — SUCTION CANISTER, 3000CC,SAFELINER: Brand: DEROYAL

## (undated) DEVICE — SNARE OPTMZ PLPCTM TRP

## (undated) DEVICE — STERILE TETRA-FLEX CF, ELASTIC BANDAGE, 4" X 5.5YD: Brand: TETRA-FLEX™CF

## (undated) DEVICE — SOLUTION  .9 1000ML BTL

## (undated) DEVICE — SNARE CAPTIFLEX MICRO-OVL OLY

## (undated) DEVICE — 35 ML SYRINGE REGULAR TIP: Brand: MONOJECT

## (undated) DEVICE — PLASTIC HAND: Brand: MEDLINE INDUSTRIES, INC.

## (undated) DEVICE — DISPOSABLE TOURNIQUET CUFF DUAL BLADDER, DUAL PORT AND QUICK CONNECT CONNECTOR: Brand: COLOR CUFF

## (undated) DEVICE — MEGADYNE E-Z CLEAN BLADE 2.75"

## (undated) DEVICE — GAMMEX® PI HYBRID SIZE 7, STERILE POWDER-FREE SURGICAL GLOVE, POLYISOPRENE AND NEOPRENE BLEND: Brand: GAMMEX

## (undated) DEVICE — KIT ENDO ORCAPOD 160/180/190

## (undated) DEVICE — ENCORE® LATEX ACCLAIM SIZE 8, STERILE LATEX POWDER-FREE SURGICAL GLOVE: Brand: ENCORE

## (undated) DEVICE — DRAPE SRG 131X112X63IN GYN URO

## (undated) DEVICE — SUT VICRYL 3-0 PS-2 J497G

## (undated) DEVICE — SUT ETHILON 4-0 PS-2 1667G

## (undated) DEVICE — MINOR GENERAL: Brand: MEDLINE INDUSTRIES, INC.

## (undated) DEVICE — DRAPE,UNDRBUT,WHT GRAD PCH,CAPPORT,20/CS: Brand: MEDLINE

## (undated) DEVICE — BANDAGE ROLL,100% COTTON, 6 PLY, SMALL: Brand: KERLIX

## (undated) DEVICE — Device

## (undated) DEVICE — KIT CLEAN ENDOKIT 1.1OZ GOWNX2

## (undated) DEVICE — INTENDED FOR TISSUE SEPARATION, AND OTHER PROCEDURES THAT REQUIRE A SHARP SURGICAL BLADE TO PUNCTURE OR CUT.: Brand: BARD-PARKER ® STAINLESS STEEL BLADES

## (undated) DEVICE — PADDING CAST 3IN

## (undated) DEVICE — BANDAGE,GAUZE,BULKEE II,4.5"X4.1YD,STRL: Brand: MEDLINE

## (undated) DEVICE — SUT ETHILON 3-0 PS-2 1669H

## (undated) DEVICE — STIRRUP STRAP W/SLING RING

## (undated) DEVICE — DRAPE SHEET LARGE 76X55

## (undated) DEVICE — MEDI-VAC NON-CONDUCTIVE SUCTION TUBING 6MM X 1.8M (6FT.) L: Brand: CARDINAL HEALTH

## (undated) DEVICE — GOWN SURG AERO BLUE PERF XLG

## (undated) DEVICE — SPECIALTY ARM SLING: Brand: DEROYAL

## (undated) DEVICE — ENCORE® LATEX MICRO SIZE 8, STERILE LATEX POWDER-FREE SURGICAL GLOVE: Brand: ENCORE

## (undated) DEVICE — LINE MNTR ADLT SET O2 INTMD

## (undated) NOTE — LETTER
22      Patient: Jose Lu  : 1975 Visit date: 2022    Dear Sarahydallas Torres,      I examined your patient in consultation today. He has severe right ulnar neuropathy at the elbow. We will plan ulnar nerve decompression and transposition. Thank you for your kind referral. If I may answer any questions, please feel free to contact me.      Sincerely,   Filiberto Severs, MD     CC: Guilherme Lee MD

## (undated) NOTE — LETTER
[address], [place]  Authorization for Invasive Procedures  1. I hereby authorize Dr. Janine Duran, my physician and whomever may be designated as the doctor's assistant, to perform the following operation and/or procedure:  Colonoscopy on Nolene Pen at [facility].    2. My physician has explained to me the nature and purpose of the operation or other procedure, possible alternative methods of treatment, the risks involved and the possibility of complications to me. I understand the probable consequences of declining the recommended procedure and the alternative methods of treatment. I acknowledge that no guarantee has been made as to the result that may be obtained. 3. I recognize that during the course of this operation or other procedure, unforeseen conditions may necessitate additional or different procedures than those listed above. I, therefore, further authorize and request that the above-named physician, his/her physician assistants, or designees perform such procedures as are, in his/her professional opinion, necessary and desirable. If I have a Do Not Attempt Resuscitation (DNAR) order in place, that status will be suspended while in the operating room, procedural suite, and during the recovery period unless otherwise explicitly stated by me (or a person authorized to consent on my behalf). The surgeon or my attending physician will determine when the applicable recovery period ends for purposes of reinstating the DNAR order. 4. Should the need arise during my operation or immediate post-operative period; I also consent to the administration of blood and/or blood products.  Further, I understand that despite careful testing and screening of blood and blood products, I may still be subject to ill effects as a result of recieving a blood transfusion an/or blood producst. The following are some, but not all, of the potential risks that can occur: fever and allergic reactions, hemolytic reactions, transmission of disease such as hepatitis, AIDS, cytomegalovirus (CMV), and flluid overload. In the event that I wish to have autologous transfusions of my own blood, or a directed donor transfusion, I will discuss this with my physician. 5. I consent to the photographing of the operations or procedures to be performed for the purposes of advancing medicine, science, and/or education, provided my identity is not revealed. If the procedure has been videotaped, the physician/surgeon will obtain the original videotape. The hospital will not be responsible for storage or maintenance of this tape. 6. I consent to the presence of a  or observer as deemed necessary by my physician or his designee. 7. Any tissues or organs removed in the operation or other procedure may be disposed of by and at the discretion of [facility].    8. I understand that the physician and his/her physician assistants may not be employees or agents of [facility], [facility], [facility] hospital, but are independent medical practitioners who have been permitted to use its facilities for the care and treatment of their patients. 9. Patients having a sterilization procedure: I understand that if the procedure is successful the results will be permanent and it will therefore be impossible for me to inseminate, conceive or bear children. I also understand that the procedure is intended to result in sterility, although the result has not been guaranteed. 10. I CERTIFY THAT I HAVE READ AND FULLY UNDERSTAND THE ABOVE CONSENT TO OPERATION and/or OTHER PROCEDURE. 11. I acknowledge that my physician has explained sedation/analgesia administration to me including the risks and benefits. I consent to the administration of sedation/analgesia as may be necessary or desirable in the judgment of my physician.      Signature of Patient:  ________________________________________________ Date: _________Time: _________    Responsible person in case of minor or unconscious: _____________________________Relationship: ____________     Witness Signature: ____________________________________________ Date: __________ Time: ___________    Statement of Physician  My signature below affirms that prior to the time of the procedure, I have explained to the patient and/or his legal representative, the risks and benefits involved in the proposed treatment and any reasonable alternative to the proposed treatment. I have also explained the risks and benefits involved in the refusal of the proposed treatment and have answered the patient's questions. If I have a significant financial interest in this procedure/surgery, I have disclosed this and had a discussion with my patient.     Signature of Physician:   ________________________________________Date: _________Time:_______ Patient Name: Ricarda Hurt  : 1975   Printed: 2022    Medical Record #: [id number]

## (undated) NOTE — LETTER
[address]  [address] [place] [address]  [phone]        Dear Dr. Arias Spears,      I had the pleasure of seeing your patient, Steve Garcia on 9/29/2020. Below please find a summary of our visit.   If you h • azathioprine 50 MG Oral Tab Take 2 tablets (100 mg total) by mouth 2 (two) times daily.  360 tablet 0   • Pyridostigmine Bromide 60 MG Oral Tab Two po am, three  at lunch and three at dinner 240 tablet 12   • Pyridostigmine Bromide  MG Oral Tab CR T Substance and Sexual Activity      Alcohol use:  Yes        Alcohol/week: 0.0 standard drinks        Frequency: Monthly or less        Binge frequency: Never        Comment: drinks less than 1 time per month      Drug use: Not Currently        Comment: ma Cranial Nerves: Pupils Equal, Round and reactive, Extra Ocular movements intact, face symmetric, tongue midline, V1-V3 intact bilaterally. Cranial Nerves 3-7,9-12 are normal. No nystagmus. Motor: 5/5 strength in the upper and lower extremities.   No prona •  Ibuprofen 200 MG Oral Tab, Take 200 mg by mouth every 6 (six) hours as needed. , Disp: , Rfl:         No follow-ups on file.     Cathy Carrera MD, MD

## (undated) NOTE — LETTER
07/01/21        Claudia Rudolph  [address]  [address] [place] [address] Dr      Dear Richard Calvillo,    Our records indicate that you have outstanding lab work and or testing that was ordered for you and has not yet been completed:  Orders Placed This Encounter

## (undated) NOTE — IP AVS SNAPSHOT
[address]  [address] [facility] ~ [phone]                Discharge Summary   2/10/2017    Dolores Keith           Admission Information        Provider Department    2/10/2017 Rachael Priest DO [facility] 2w/Sw Last time this was given:  0.5 mg on 2/16/2017 12:04 AM   Commonly known as:  XANAX        Take 1 tablet (0.5 mg total) by mouth nightly as needed for Sleep.     Jaqueline Stinson                        azathioprine 50 MG Tabs   Last time this was given:  50 mg o [address] [facility]  [phone]        Immunization History as of 2/16/2017  Never Reviewed    INFLUENZA 1/18/2013      Recent Hematology Lab Results  (Last 3 results in the past 90 days)    WBC RBC Hemoglobin Hematocrit MCV MCH MCHC RDW Platele Radiology Exams     None         Additional Information       We are concerned for your overall well being:    - If you are a smoker or have smoked in the last 12 months, we encourage you to explore options for quitting.     - If you have concerns related experience these side effects or respond to medications the same. Please call your provider or healthcare team if you have any questions regarding your medications while at home.          Non-Narcotic Pain Medications     Ibuprofen 200 MG Oral Tab       Use What to report to your healthcare team: Problems staying awake, confusion, memory problems             All Other Medications     azathioprine 50 MG Oral Tab    Multiple Vitamins-Minerals (MULTIVITAMIN OR)

## (undated) NOTE — IP AVS SNAPSHOT
Patient Demographics     Address Phone E-mail Address    [address] [phone] [facility])  [phone] (Mobile) *Preferred* [email or web address]      Emergency Contact(s)     Name Relation Home Work [employer] [employer] Take  by mouth.                         predniSONE 20 MG Tabs   Last time this was given:  60 mg on 2/16/2017  9:22 AM   Commonly known as:  Mitra Haskins   Next dose due:  Next dose 02/17/17 @ 0800-w/ breakfast        Take 3 tablets (60 mg total) by mouth da [id number] diphenhydrAMINE (BENADRYL) cap/tab 25 mg 02/15/17 2117 Given      [id number] predniSONE (DELTASONE) tab 60 mg 02/16/17 0922 Given                    Recent Vital Signs       Most Recent Value    Vitals 119/66 mmHg Filed at 02/16/2017 1200    Puls GFR, Non-African American >60 >=60   [facility]   GFR, African-American >60 >=60   [facility]   Comment:           Chronic Kidney Disease: GFR <60 ml/min/1.73 m2  Kidney failure: GFR <15 ml/min/1.73 m2    The accuracy of the MDRD equation is not suitab acetylcholine to the receptor, leading to poor muscle contraction. Modulating antibody causes receptor endocytosis resulting in loss   of AChR expression, which correlates most closely with clinical   severity of disease.  Approximately 10-15 percent of i Laboratories. See Compliance Statement B: [employer]  Performed by [facility],  [address] [place] [address], [address] [place] [address] [phone]  www. Gregorio Das MD, Lab.  Director            Testing Performed By     [facility] Jackson Moody Name Director -cont steroids, took 60 mg this AM  -mestinon increased to 120 mg from 90mg by Dr. Gary Reyez was negative, no desaturation. Not tachy.   Low suspicion for PE.  CXR with no acute findings  -monitor in PCU  -discussed with Dr Lisa Vega No Known Allergies     Home Medications:    No outpatient prescriptions have been marked as taking for the 2/10/17 encounter [facility] Encounter).     Soc Hx     Smoking status: Former Smoker     Smokeless tobacco: Never Used    Comment: quit 4-5 yrs ago There are clips-sutures over the cardiac silhouette and mediastinum, compatible with prior thymectomy (as noted in the electronic record). Wall pulmonary vasculature. MEDIAST/POP:   No visible mass or adenopathy.  LUNGS/PLEURA: No significant pulmonary pa FEN  -no IVF  -lytes in AM  -general diet    PPX SCD: plan for line, start SQ hep after line    Full code    Outpatient records reviewed confirming patient's medical history and medications. Further recommendations pending patient's clinical course. Comment: every once in a while         Fam Hx  Family History   Problem Relation Age of Onset   • Cancer Father      unknown   • Hypertension Mother    • DVT[other] [OTHER] Mother    • [other] [OTHER] Sister      1           Objective     Temp: 97.6 °F (3 effusion or pleural thickening. BONES: Mild degenerative disc disease and spondylosis without visible acute abnormalities. There are sternotomy wires. OTHER: Negative.    Dictated by (CST): Alesha Michaels MD on 2/10/2017 at 16:57     Approved by (CST): Ismael chest pain. A couple of weeks ago he states there was a strange feeling in his mouth, but no change in his ability to swallow or speech. He denies headaches, fevers, sweats, chills, GI or  symptoms, focal joint pain, swelling, skin rash.      He This would be an unusual presentation for exacerbation of his myasthenia gravis, albeit he states he has had this in the past.     If the CT of the chest is negative, I spoke with Dr. Camelia Temple and suggested that he contact [facility], have Nephrol Medication Administration Recommendations: One pill at a time    Patient Experiencing Pain: No                Discharge Recommendations  Discharge Recommendations/Plan: Undetermined    Treatment Plan  Treatment Plan: Dysphagia therapy    Comments:    Inter

## (undated) NOTE — LETTER
8/15/2022              To Whom It May Concern,    Elkin Bernardo is currently under my medical care. Please excuse him from work for the next 4 weeks. If you require more information please contact our office.             Sincerely,    Farnco Alejandre MD  [address] [place] [address]  [phone]

## (undated) NOTE — LETTER
[address]  INFORMED CONSENT FOR TRANSFUSION OF BLOOD OR BLOOD PRODUCTS   My physician has informed me of the nature, purpose, benefits and risks of transfusion for blood and blood components that he/she may deem nece Patient)                                       (Responsible party in case of Minor,                                                                            Incompetent, or unconscious Patient)  __________________________________    _____________________

## (undated) NOTE — LETTER
5/11/2023    Norris Smith [address]            Dear Norris Ziegler,      Our records indicate that you are due for an appointment for a Colonoscopy in August 2023 with Rafal Burdick MD. Our doctors are booking out about 3-5 months in advance for procedures. Please call our office to schedule a phone screening appointment to plan for the procedure(s). Your medical well-being is important to us. If your insurance requires a referral, please call your primary care office to request one.       Thank you,      The Physicians and Staff at [facility]